# Patient Record
Sex: MALE | Race: WHITE | NOT HISPANIC OR LATINO | Employment: FULL TIME | URBAN - METROPOLITAN AREA
[De-identification: names, ages, dates, MRNs, and addresses within clinical notes are randomized per-mention and may not be internally consistent; named-entity substitution may affect disease eponyms.]

---

## 2017-04-03 ENCOUNTER — COMMUNICATION - HEALTHEAST (OUTPATIENT)
Dept: FAMILY MEDICINE | Facility: CLINIC | Age: 22
End: 2017-04-03

## 2017-04-10 ENCOUNTER — OFFICE VISIT - HEALTHEAST (OUTPATIENT)
Dept: FAMILY MEDICINE | Facility: CLINIC | Age: 22
End: 2017-04-10

## 2017-04-10 DIAGNOSIS — G43.909 MIGRAINE: ICD-10-CM

## 2017-04-10 DIAGNOSIS — F41.8 DEPRESSION WITH ANXIETY: ICD-10-CM

## 2017-04-10 ASSESSMENT — MIFFLIN-ST. JEOR: SCORE: 1538.77

## 2017-04-12 ENCOUNTER — AMBULATORY - HEALTHEAST (OUTPATIENT)
Dept: FAMILY MEDICINE | Facility: CLINIC | Age: 22
End: 2017-04-12

## 2017-05-01 ENCOUNTER — OFFICE VISIT - HEALTHEAST (OUTPATIENT)
Dept: FAMILY MEDICINE | Facility: CLINIC | Age: 22
End: 2017-05-01

## 2017-05-01 DIAGNOSIS — F41.1 GAD (GENERALIZED ANXIETY DISORDER): ICD-10-CM

## 2017-05-01 ASSESSMENT — MIFFLIN-ST. JEOR: SCORE: 1534.23

## 2017-05-03 ENCOUNTER — AMBULATORY - HEALTHEAST (OUTPATIENT)
Dept: FAMILY MEDICINE | Facility: CLINIC | Age: 22
End: 2017-05-03

## 2017-06-12 ENCOUNTER — COMMUNICATION - HEALTHEAST (OUTPATIENT)
Dept: FAMILY MEDICINE | Facility: CLINIC | Age: 22
End: 2017-06-12

## 2017-08-24 ENCOUNTER — COMMUNICATION - HEALTHEAST (OUTPATIENT)
Dept: FAMILY MEDICINE | Facility: CLINIC | Age: 22
End: 2017-08-24

## 2017-08-25 ENCOUNTER — AMBULATORY - HEALTHEAST (OUTPATIENT)
Dept: FAMILY MEDICINE | Facility: CLINIC | Age: 22
End: 2017-08-25

## 2017-08-25 DIAGNOSIS — F41.8 DEPRESSION WITH ANXIETY: ICD-10-CM

## 2017-12-11 ENCOUNTER — OFFICE VISIT - HEALTHEAST (OUTPATIENT)
Dept: FAMILY MEDICINE | Facility: CLINIC | Age: 22
End: 2017-12-11

## 2017-12-11 DIAGNOSIS — F41.1 GAD (GENERALIZED ANXIETY DISORDER): ICD-10-CM

## 2017-12-11 ASSESSMENT — MIFFLIN-ST. JEOR: SCORE: 1570.52

## 2017-12-13 ENCOUNTER — AMBULATORY - HEALTHEAST (OUTPATIENT)
Dept: FAMILY MEDICINE | Facility: CLINIC | Age: 22
End: 2017-12-13

## 2018-01-23 ENCOUNTER — COMMUNICATION - HEALTHEAST (OUTPATIENT)
Dept: FAMILY MEDICINE | Facility: CLINIC | Age: 23
End: 2018-01-23

## 2018-01-31 ENCOUNTER — OFFICE VISIT - HEALTHEAST (OUTPATIENT)
Dept: FAMILY MEDICINE | Facility: CLINIC | Age: 23
End: 2018-01-31

## 2018-01-31 DIAGNOSIS — F41.1 GAD (GENERALIZED ANXIETY DISORDER): ICD-10-CM

## 2018-01-31 ASSESSMENT — MIFFLIN-ST. JEOR: SCORE: 1559.18

## 2018-02-15 ENCOUNTER — AMBULATORY - HEALTHEAST (OUTPATIENT)
Dept: FAMILY MEDICINE | Facility: CLINIC | Age: 23
End: 2018-02-15

## 2018-10-27 ENCOUNTER — OFFICE VISIT - HEALTHEAST (OUTPATIENT)
Dept: FAMILY MEDICINE | Facility: CLINIC | Age: 23
End: 2018-10-27

## 2018-10-27 DIAGNOSIS — M54.50 ACUTE LUMBAR BACK PAIN: ICD-10-CM

## 2018-10-27 DIAGNOSIS — M54.6 MIDLINE THORACIC BACK PAIN: ICD-10-CM

## 2019-07-02 ENCOUNTER — RECORDS - HEALTHEAST (OUTPATIENT)
Dept: ADMINISTRATIVE | Facility: OTHER | Age: 24
End: 2019-07-02

## 2019-07-29 ENCOUNTER — RECORDS - HEALTHEAST (OUTPATIENT)
Dept: ADMINISTRATIVE | Facility: OTHER | Age: 24
End: 2019-07-29

## 2019-11-20 ENCOUNTER — OFFICE VISIT - HEALTHEAST (OUTPATIENT)
Dept: FAMILY MEDICINE | Facility: CLINIC | Age: 24
End: 2019-11-20

## 2019-11-20 DIAGNOSIS — F32.0 CURRENT MILD EPISODE OF MAJOR DEPRESSIVE DISORDER, UNSPECIFIED WHETHER RECURRENT (H): ICD-10-CM

## 2019-11-20 DIAGNOSIS — Z23 NEED FOR VACCINATION: ICD-10-CM

## 2019-11-20 ASSESSMENT — ANXIETY QUESTIONNAIRES
IF YOU CHECKED OFF ANY PROBLEMS ON THIS QUESTIONNAIRE, HOW DIFFICULT HAVE THESE PROBLEMS MADE IT FOR YOU TO DO YOUR WORK, TAKE CARE OF THINGS AT HOME, OR GET ALONG WITH OTHER PEOPLE: SOMEWHAT DIFFICULT
2. NOT BEING ABLE TO STOP OR CONTROL WORRYING: NOT AT ALL
GAD7 TOTAL SCORE: 7
3. WORRYING TOO MUCH ABOUT DIFFERENT THINGS: MORE THAN HALF THE DAYS
6. BECOMING EASILY ANNOYED OR IRRITABLE: MORE THAN HALF THE DAYS
1. FEELING NERVOUS, ANXIOUS, OR ON EDGE: SEVERAL DAYS
4. TROUBLE RELAXING: SEVERAL DAYS
5. BEING SO RESTLESS THAT IT IS HARD TO SIT STILL: SEVERAL DAYS
7. FEELING AFRAID AS IF SOMETHING AWFUL MIGHT HAPPEN: NOT AT ALL

## 2019-11-20 ASSESSMENT — PATIENT HEALTH QUESTIONNAIRE - PHQ9: SUM OF ALL RESPONSES TO PHQ QUESTIONS 1-9: 9

## 2019-11-20 ASSESSMENT — MIFFLIN-ST. JEOR: SCORE: 1563.71

## 2019-11-27 ENCOUNTER — AMBULATORY - HEALTHEAST (OUTPATIENT)
Dept: FAMILY MEDICINE | Facility: CLINIC | Age: 24
End: 2019-11-27

## 2019-12-30 ENCOUNTER — OFFICE VISIT - HEALTHEAST (OUTPATIENT)
Dept: FAMILY MEDICINE | Facility: CLINIC | Age: 24
End: 2019-12-30

## 2019-12-30 DIAGNOSIS — F39 MOOD DISORDER (H): ICD-10-CM

## 2019-12-30 DIAGNOSIS — F41.1 GAD (GENERALIZED ANXIETY DISORDER): ICD-10-CM

## 2019-12-30 ASSESSMENT — MIFFLIN-ST. JEOR: SCORE: 1559.18

## 2019-12-30 ASSESSMENT — ANXIETY QUESTIONNAIRES
4. TROUBLE RELAXING: NOT AT ALL
2. NOT BEING ABLE TO STOP OR CONTROL WORRYING: NOT AT ALL
3. WORRYING TOO MUCH ABOUT DIFFERENT THINGS: NOT AT ALL
6. BECOMING EASILY ANNOYED OR IRRITABLE: NOT AT ALL
7. FEELING AFRAID AS IF SOMETHING AWFUL MIGHT HAPPEN: NOT AT ALL
1. FEELING NERVOUS, ANXIOUS, OR ON EDGE: NOT AT ALL
5. BEING SO RESTLESS THAT IT IS HARD TO SIT STILL: NOT AT ALL
IF YOU CHECKED OFF ANY PROBLEMS ON THIS QUESTIONNAIRE, HOW DIFFICULT HAVE THESE PROBLEMS MADE IT FOR YOU TO DO YOUR WORK, TAKE CARE OF THINGS AT HOME, OR GET ALONG WITH OTHER PEOPLE: NOT DIFFICULT AT ALL
GAD7 TOTAL SCORE: 0

## 2019-12-30 ASSESSMENT — PATIENT HEALTH QUESTIONNAIRE - PHQ9: SUM OF ALL RESPONSES TO PHQ QUESTIONS 1-9: 0

## 2020-01-09 ENCOUNTER — AMBULATORY - HEALTHEAST (OUTPATIENT)
Dept: FAMILY MEDICINE | Facility: CLINIC | Age: 25
End: 2020-01-09

## 2020-01-18 ENCOUNTER — COMMUNICATION - HEALTHEAST (OUTPATIENT)
Dept: FAMILY MEDICINE | Facility: CLINIC | Age: 25
End: 2020-01-18

## 2020-01-18 DIAGNOSIS — F32.0 CURRENT MILD EPISODE OF MAJOR DEPRESSIVE DISORDER, UNSPECIFIED WHETHER RECURRENT (H): ICD-10-CM

## 2020-02-18 ENCOUNTER — COMMUNICATION - HEALTHEAST (OUTPATIENT)
Dept: FAMILY MEDICINE | Facility: CLINIC | Age: 25
End: 2020-02-18

## 2020-02-18 DIAGNOSIS — F32.0 CURRENT MILD EPISODE OF MAJOR DEPRESSIVE DISORDER, UNSPECIFIED WHETHER RECURRENT (H): ICD-10-CM

## 2020-12-21 ENCOUNTER — RECORDS - HEALTHEAST (OUTPATIENT)
Dept: ADMINISTRATIVE | Facility: OTHER | Age: 25
End: 2020-12-21

## 2021-04-05 ENCOUNTER — COMMUNICATION - HEALTHEAST (OUTPATIENT)
Dept: FAMILY MEDICINE | Facility: CLINIC | Age: 26
End: 2021-04-05

## 2021-05-26 ASSESSMENT — PATIENT HEALTH QUESTIONNAIRE - PHQ9: SUM OF ALL RESPONSES TO PHQ QUESTIONS 1-9: 9

## 2021-05-27 ASSESSMENT — PATIENT HEALTH QUESTIONNAIRE - PHQ9: SUM OF ALL RESPONSES TO PHQ QUESTIONS 1-9: 0

## 2021-05-28 ASSESSMENT — ANXIETY QUESTIONNAIRES
GAD7 TOTAL SCORE: 0
GAD7 TOTAL SCORE: 7

## 2021-05-30 VITALS — WEIGHT: 129 LBS | HEIGHT: 67 IN | BODY MASS INDEX: 20.25 KG/M2

## 2021-05-30 VITALS — HEIGHT: 67 IN | BODY MASS INDEX: 20.09 KG/M2 | WEIGHT: 128 LBS

## 2021-05-31 VITALS — HEIGHT: 67 IN | WEIGHT: 136 LBS | BODY MASS INDEX: 21.35 KG/M2

## 2021-06-01 VITALS — WEIGHT: 137 LBS | BODY MASS INDEX: 22.02 KG/M2 | HEIGHT: 66 IN

## 2021-06-02 VITALS — WEIGHT: 145 LBS | BODY MASS INDEX: 23.4 KG/M2

## 2021-06-03 NOTE — PROGRESS NOTES
" PROGRESS NOTE       SUBJECTIVE:  Jamir Vallejo is a 24 y.o. male   Chief Complaint   Patient presents with     Depression     discuss medications    Shahzad was here with his mother Carmela.  He has shared with her that he is not been doing well.  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  He continues to live at home.  He works [employer] part-time now.  He is been able to do his college work fine but he has been off his SSRI for quite some time now.  Sertraline caused vomiting and that was in April 2017.  He was switched to citalopram 20 mg and seemed to do better with that.  He has been suicidal in the past and was seen at the emergency room.  This was connected to a break-up with a girlfriend.  Mother reports that he had been in a new relationship recently and she just broke up with him prompting her to say she was bring him in to see me.  Shahzad denies being suicidal and thinks he is smarter about things this time but it still does not feel good.  He did try going to [id number] but it \"did not work out\".  He finds it difficult to share.  We talked about the challenges of finding a counselor that one can work with.  He is willing to take medicine again knowing that it does help.      Patient Active Problem List   Diagnosis     FLASH (generalized anxiety disorder)     Depression, major, recurrent, severe with psychosis (H)     Mood disorder (H)       Current Outpatient Medications   Medication Sig Dispense Refill     citalopram (CELEXA) 40 MG tablet Take 1 tablet (40 mg total) by mouth daily. 30 tablet 2     hydrOXYzine pamoate (VISTARIL) 25 MG capsule Take 1 capsule (25 mg total) by mouth 3 (three) times a day as needed for itching. 30 capsule 0     sertraline (ZOLOFT) 50 MG tablet Take 1 tablet (50 mg total) by mouth daily. 30 tablet 2     SUMAtriptan (IMITREX) 50 MG tablet Take p.o. at very start of headache, may repeat in 2 hours       No current facility-administered medications for this " visit.        Social History     Tobacco Use   Smoking Status Never Smoker   Smokeless Tobacco Never Used       REVIEW OF SYSTEMS:     Patient has little interest and pleasure in doing things more than half the time, he has some sleep disruption more than half the time.  He feels down and depressed several days, tired, poor appetite, feeling guilty about his feelings, and difficulty concentrating several days per week.  He specifically denies suicidal thoughts or harmful thoughts for himself or others.  He also has not had decreased moving or speaking or the opposite restlessness.  PHQ 9 score is 9 and beverley 7 score is 7.  OBJECTIVE:         Vitals:    11/20/19 1634   BP: 114/68   Pulse: 70     Weight: 138 lb (62.6 kg)    Wt Readings from Last 3 Encounters:   11/20/19 138 lb (62.6 kg)   10/27/18 145 lb (65.8 kg)   01/31/18 137 lb (62.1 kg)     Body mass index is 22.27 kg/m .        Physical Exam:  GENERAL APPEARANCE: A&A, NAD, well hydrated, well nourished.  Shahzad is neatly dressed.  His mood is serious.  He answers questions easily and he makes eye contact.  I did not see him smile.  Heart sounds are normal and lung sounds are clear.  PSYCHIATRIC:  Mood appropriate, memory intact        ASSESSMENT/PLAN:     1. Current mild episode of major depressive disorder, unspecified whether recurrent (H)  He will begin by taking 1/2 tablet for the first 2 to 4 days until his body adjusts.  That way he may avoid nausea.  - sertraline (ZOLOFT) 50 MG tablet; Take 1 tablet (50 mg total) by mouth daily.  Dispense: 30 tablet; Refill: 2    2. Need for vaccination  Flu vaccine is administered.  - Influenza,Seasonal,Quad,INJ =/>6months      I recommend patient come back and see me in 6-8 weeks, especially since he is not planning to see a counselor.  Sooner if any problems.  I spent a total of 30 minutes face to face with the patient.  Over 50% of the time spent counseling and educating the patient about all of the above.      Laly Malik  MD Jorge

## 2021-06-04 VITALS
HEIGHT: 66 IN | HEART RATE: 70 BPM | DIASTOLIC BLOOD PRESSURE: 68 MMHG | SYSTOLIC BLOOD PRESSURE: 114 MMHG | WEIGHT: 138 LBS | BODY MASS INDEX: 22.18 KG/M2

## 2021-06-04 VITALS
DIASTOLIC BLOOD PRESSURE: 62 MMHG | HEIGHT: 66 IN | HEART RATE: 78 BPM | WEIGHT: 137 LBS | SYSTOLIC BLOOD PRESSURE: 100 MMHG | BODY MASS INDEX: 22.02 KG/M2

## 2021-06-04 NOTE — PROGRESS NOTES
PROGRESS NOTE       SUBJECTIVE:  Jamir Vallejo is a 24 y.o. male   Chief Complaint   Patient presents with     Follow-up     Shahzad is here in follow-up from his overreaction when his girlfriend broke up with him.  We talked about how to anticipate things a little better and keep the emotions in check and being smart.  He feels he is in a better place now and is working with a counselor.  He is finding it helpful.  He continues to live at home with his parents.  He is eating well and sleeping fine.  He is attending school in [employer].  He has another year left.  He is looking forward to being done.    He is taking the sertraline 50 mg daily and has no problems with it.  He prefers to stay on it and sees no reason to change dosing.  His counselor agrees.    Patient Active Problem List   Diagnosis     JERZY (generalized anxiety disorder)     Depression, major, recurrent, severe with psychosis (H)     Mood disorder (H)       Current Outpatient Medications   Medication Sig Dispense Refill     sertraline (ZOLOFT) 50 MG tablet Take 1 tablet (50 mg total) by mouth daily. 30 tablet 2     No current facility-administered medications for this visit.        Social History     Tobacco Use   Smoking Status Never Smoker   Smokeless Tobacco Never Used       REVIEW OF SYSTEMS:   Jerzy 7 score is 0 and PHQ 9 score is 0.      OBJECTIVE:       Vitals:    12/30/19 1101   BP: 100/62   Pulse: 78     Weight: 137 lb (62.1 kg)    Wt Readings from Last 3 Encounters:   12/30/19 137 lb (62.1 kg)   11/20/19 138 lb (62.6 kg)   10/27/18 145 lb (65.8 kg)     Body mass index is 22.11 kg/m .        Physical Exam:  GENERAL APPEARANCE: A&A, NAD, well hydrated, well nourished  Shahzad appears well with a social smile, good eye contact and is tended to self cares.  He answers questions appropriately and spontaneously.        ASSESSMENT/PLAN:     1. JERZY (generalized anxiety disorder)      2. Mood disorder (H)  We talked about ways to anticipate stresses  and allow himself time before he reacts to things.        I spent a total of 25 minutes face to face with the patient.  Over 50% of the time spent counseling and educating the patient about all of the above.      Laly Patel MD

## 2021-06-05 NOTE — TELEPHONE ENCOUNTER
Refill Approved    Rx renewed per Medication Renewal Policy. Medication was last renewed on 11/20/19.    [facility] Triage/Med Refill 1/19/2020     Requested Prescriptions   Pending Prescriptions Disp Refills     sertraline (ZOLOFT) 50 MG tablet [Pharmacy Med Name: SERTRALINE HCL 50MG TABS] 30 tablet 2     Sig: TAKE ONE TABLET BY MOUTH ONCE DAILY       SSRI Refill Protocol  Passed - 1/18/2020  4:19 PM        Passed - PCP or prescribing provider visit in last year     Last office visit with prescriber/PCP: 12/30/2019 Laly Patel MD OR same dept: 12/30/2019 Laly Patel MD OR same specialty: 12/30/2019 Laly Patel MD  Last physical: Visit date not found Last MTM visit: Visit date not found   Next visit within 3 mo: Visit date not found  Next physical within 3 mo: Visit date not found  Prescriber OR PCP: Laly Patel MD  Last diagnosis associated with med order: 1. Current mild episode of major depressive disorder, unspecified whether recurrent (H)  - sertraline (ZOLOFT) 50 MG tablet [Pharmacy Med Name: SERTRALINE HCL 50MG TABS]; TAKE ONE TABLET BY MOUTH ONCE DAILY  Dispense: 30 tablet; Refill: 2    If protocol passes may refill for 12 months if within 3 months of last provider visit (or a total of 15 months).
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
## 2021-06-10 NOTE — PROGRESS NOTES
ASSESSMENT/PLAN:     1. FLASH (generalized anxiety disorder)  Patient has made remarkable progress in only a couple weeks.  We talked about how this is almost unusual that he is turning things around so quickly.  There will be bumps in the road.  The medicine certainly is helping him stabilize his mood so he has a solid base to work from.  He may encounter more difficulty and he should consider that just a bad day and not assigned that it is all coming back.  He is looking into getting therapy set up and has some places to call.  Things are going better at home as well because everyone is recognizing he is feeling better.  It also helps to be working full-time.  I would like for him to come back and see me in 2 months to be sure he continues on the right track.  Patient agrees with the plan.    There are no Patient Instructions on file for this visit.  There are no discontinued medications.  Return in about 2 months (around 7/1/2017) for Anxiety.    Laly Patel MD    CHIEF COMPLAINT  Chief Complaint   Patient presents with     Follow-up     new medications, going good        HPI:  Jamir Vallejo is a 21 y.o. male presenting to the clinic today to follow-up regarding depression.    Depression: He is currently controlling his mood with 50 mg of daily sertraline. He started taking sertraline three weeks ago. He had problems since December of 2016. He has found sertraline to be helpful and has not noticed any problems with it. He has been able to get to bed on time and sleep more. He has noticed an increase in appetite, as well as a change in attitude. He states that he is less cranky and others, his parents and friends, have noticed this attitude change through conversations. He has noticed that he feels slightly more relaxed in social situations. He is continuing to consider seeing a therapist.    REVIEW OF SYSTEMS:   He denies diarrhea. All other systems negative.     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 He continues to live at home.    Patient Active Problem List   Diagnosis     FLASH (generalized anxiety disorder)       TOBACCO USE:  History   Smoking Status     Never Smoker   Smokeless Tobacco     Not on file     Social History     Social History     Marital status: Single     Spouse name: N/A     Number of children: N/A     Years of education: N/A     Occupational History     Detailing cars      [employer]     Social History Main Topics     Smoking status: Never Smoker     Smokeless tobacco: Not on file     Alcohol use Not on file     Drug use: Not on file     Sexual activity: Not on file     Other Topics Concern     Not on file     Social History Narrative       OBJECTIVE:   PHQ-9 score 2, FLASH-7 score 1    Vitals:    05/01/17 1258   BP: 100/62   Pulse: 78     Weight: 128 lb (58.1 kg)  Wt Readings from Last 3 Encounters:   05/01/17 128 lb (58.1 kg) (10 %, Z= -1.27)*   04/10/17 129 lb (58.5 kg) (11 %, Z= -1.21)*   09/09/14 127 lb (57.6 kg) (25 %, Z= -0.68)*     * Growth percentiles are based on CDC 2-20 Years data.     Body mass index is 20.05 kg/(m^2).      Physical Exam:  GENERAL APPEARANCE: A&A, NAD, well hydrated, well nourished  NEURO: no gross deficits   PSYCHIATRIC;  Mood appropriate, memory intact      The following are part of a depression follow up plan for the patient:  implementation of measures to provide psychological support, mental health care assessment and patient follow-up to return when and if necessary      Laly Patel MD    ADDITIONAL HISTORY SUMMARIZED (2): None.  DECISION TO OBTAIN EXTRA INFORMATION (1): None.   RADIOLOGY TESTS (1): None.  LABS (1): None.  MEDICINE TESTS (1): None.  INDEPENDENT REVIEW (2 each): None.     The visit lasted a total of 12 minutes face to face with the patient. Over 50% of the time was spent counseling and educating the patient about depression.    Lex NEAL, am scribing for and in the presence  of, Dr. Patel.    I, Dr. Patel, personally performed the services described in this documentation, as scribed by Lex Mohr in my presence, and it is both accurate and complete.       MEDICATIONS   Current Outpatient Prescriptions   Medication Sig Dispense Refill     sertraline (ZOLOFT) 50 MG tablet Take 1 tablet (50 mg total) by mouth daily. 30 tablet 2     SUMAtriptan (IMITREX) 50 MG tablet Take p.o. at very start of headache, may repeat in 2 hours       No current facility-administered medications for this visit.          Total data points: 0

## 2021-06-14 NOTE — PROGRESS NOTES
" PROGRESS NOTE       SUBJECTIVE:  Jamir Vallejo is a 22 y.o. male   Chief Complaint   Patient presents with     Follow-up     DISCUSS MEDICATION, POSSIBLE CHANGE    Shahzad is here today to review his medications.  He states that he does not think it is working anymore.  He takes the medicine and nothing happens.  He generally has not been taking it for a while.  When he took it it \"made him sick and he vomited.  He tried to get back on it but it kept making him sick.    Patient is currently working at [email or web address].  He has a girlfriend and they are planning to move to an apartment in [place] as soon as they are able financially which may be springtime.  He has no plans for advanced education at this time.  He states everything one step at a time.      Patient Active Problem List   Diagnosis     FLASH (generalized anxiety disorder)       Current Outpatient Prescriptions   Medication Sig Dispense Refill     sertraline (ZOLOFT) 50 MG tablet Take 1 tablet (50 mg total) by mouth daily. 90 tablet 1     SUMAtriptan (IMITREX) 50 MG tablet Take p.o. at very start of headache, may repeat in 2 hours       No current facility-administered medications for this visit.        History   Smoking Status     Never Smoker   Smokeless Tobacco     Not on file       REVIEW OF SYSTEMS:  Patient denies fever, chills, dizziness, headache, visual change, cough, chest pain, shortness of breath, abdominal pain, extremity pain or swelling.  Denies mood swings, excessive sadness, crying episodes, fatigue, insomnia, anhedonia, irritability, feelings of extreme energy, suicidal/homicidal ideations.    Denies feelings of guilt, worthlessness, ruminations, appetite change, hopelessness, helplessness, low mood >2 weeks, decreased interest, irritability, crying, low energy, decreased concentration, psychomotor slowing or agitation.    He does have troubles falling asleep and feels depressed several days out of the week.  This is not persistent, " however.      OBJECTIVE:       Vitals:    12/11/17 1559   BP: 114/70   Pulse: 72     Weight: 136 lb (61.7 kg)  Wt Readings from Last 3 Encounters:   12/11/17 136 lb (61.7 kg)   05/01/17 128 lb (58.1 kg) (10 %, Z= -1.27)*   04/10/17 129 lb (58.5 kg) (11 %, Z= -1.21)*     * Growth percentiles are based on [facility] 2-20 Years data.     Body mass index is 21.3 kg/(m^2).        Physical Exam:  GENERAL APPEARANCE: A&A, NAD, well hydrated, well nourished  Patient has good eye contact and is socially appropriate with a spontaneous smile.  PSYCHIATRIC:  Mood appropriate, memory intact        ASSESSMENT/PLAN:     1. BEVERLEY (generalized anxiety disorder)  Shahzad was working hard at figuring things out.  We talked about how this is a difficult time for a lot of people his age.  It is very difficult to make it on your own without parental support.  I pointed out that his beverley 7 score is 2 and his PHQ 9 score is 3.  When we began treatment his beverley 7 score was 20 and his PHQ 9 score was 23.  This tells me that he most certainly is doing better and while the numbers do not tell the whole story, it is reassuring.  It is good for him to recognize his feelings and then figure out what to do with them.  This is a very grown up process that he will need to practice.    I think the reason the Zoloft made him sick when he restarted his that he did not ease into it by cutting it in half.  On the other hand, because he has had side effects from it and he has not been taking it I recommended we try citalopram.  He will cut these in half the first 4 days and then go up to a full dose.  He should return to see me in 3 months.  - citalopram (CELEXA) 20 MG tablet; Take 1 tablet (20 mg total) by mouth daily.  Dispense: 30 tablet; Refill: 5    There are no Patient Instructions on file for this visit.  There are no discontinued medications.  No Follow-up on file.    The visit lasted a total of 25 minutes face to face with the patient.  Over 50% of the time  spent counseling and educating the patient about all of the above.      Laly Patel MD

## 2021-06-15 PROBLEM — F41.1 GAD (GENERALIZED ANXIETY DISORDER): Status: ACTIVE | Noted: 2017-05-01

## 2021-06-15 NOTE — PROGRESS NOTES
PROGRESS NOTE       SUBJECTIVE:  Jamir Vallejo is a 22 y.o. male   Chief Complaint   Patient presents with     Follow-up     depression issues    Shahzad was hospitalized for suicidal ideation.  He states that he was glad that he got help and he is feeling better about things.  He is working with a therapist on reducing anxiety and what to do when he feels panic.  He is working at [employer] up to 30 hours per week.  This is going okay.  He is wondering if a dose increase would be beneficial.  He and his therapist have discussed this.      Patient Active Problem List   Diagnosis     BEVERLEY (generalized anxiety disorder)     Depression, major, recurrent, severe with psychosis     Mood disorder       Current Outpatient Prescriptions   Medication Sig Dispense Refill     citalopram (CELEXA) 40 MG tablet Take 1 tablet (40 mg total) by mouth daily. 30 tablet 2     hydrOXYzine pamoate (VISTARIL) 25 MG capsule Take 1 capsule (25 mg total) by mouth 3 (three) times a day as needed for itching. 30 capsule 0     SUMAtriptan (IMITREX) 50 MG tablet Take p.o. at very start of headache, may repeat in 2 hours       No current facility-administered medications for this visit.        History   Smoking Status     Never Smoker   Smokeless Tobacco     Never Used       REVIEW OF SYSTEMS:   Patient feels anxious more than half the time currently.  He is now able to stop the worrying and has not been worrying about different things.  He is now able to relax with the techniques he has been taught.  Sometimes it is hard to sit still and he does remain irritable over half the time.  He still has concerns that something bad might happen.  His bveerley 7 score is 8 and his PHQ 9 score is 9.        OBJECTIVE:       Vitals:    01/31/18 1435   BP: 100/62   Pulse: 92   SpO2: 98%     Weight: 137 lb (62.1 kg)  Wt Readings from Last 3 Encounters:   01/31/18 137 lb (62.1 kg)   12/11/17 136 lb (61.7 kg)   05/01/17 128 lb (58.1 kg) (10 %, Z= -1.27)*     * Growth  percentiles are based on [facility] 2-20 [facility] data.     Body mass index is 22.11 kg/(m^2).        Physical Exam:  GENERAL APPEARANCE: A&A, NAD, well hydrated, well nourished  Otherwise not examined  PSYCHIATRIC:  Mood appropriate, memory intact patient has good eye contact and is socially appropriate.  He is tended to self cares. His gaze does shift away at times.        ASSESSMENT/PLAN:     1. FLASH (generalized anxiety disorder)  I agree with increasing his Celexa to 40 mg daily.  I have also recommended using hydroxyzine 25 mg as needed for anxiety.  - hydrOXYzine pamoate (VISTARIL) 25 MG capsule; Take 1 capsule (25 mg total) by mouth 3 (three) times a day as needed for anxiety.  He agrees to give this a try if needed.  Dispense: 30 capsule; Refill: 0  - citalopram (CELEXA) 40 MG tablet; Take 1 tablet (40 mg total) by mouth daily.  Dispense: 30 tablet; Refill: 2      There are no Patient Instructions on file for this visit.  Medications Discontinued During This Encounter   Medication Reason     citalopram (CELEXA) 20 MG tablet Dose adjustment     Return in about 2 months (around 3/31/2018) for Recheck, Anxiety.  He will continue working with his therapist.  I had like to see him back to see how the medications working.  If he has any adverse reactions he should me know.    The visit lasted a total of 30 minutes face to face with the patient.  Over 50% of the time spent counseling and educating the patient about all of the above.      Laly Patel MD       no previous reaction

## 2021-06-16 PROBLEM — F39 MOOD DISORDER (H): Status: ACTIVE | Noted: 2018-01-24

## 2021-06-16 PROBLEM — F33.3 DEPRESSION, MAJOR, RECURRENT, SEVERE WITH PSYCHOSIS (H): Status: ACTIVE | Noted: 2018-01-24

## 2021-06-21 NOTE — PROGRESS NOTES
Chief Complaint   Patient presents with     Back Pain     Back injury at work 10/27/2018 at 10:15 am          HPI      Patient is here for work related back injury occurred around 10:15 am today. He said he slipped on a slippery concrete floor and fell back wards hurting upper and lower back. Lower back is moderate, upper back is mild to moderate. Pain is constant, worsened with walking or moving and improves with rest. No radiation of pain down legs. No fever, headache, head injury, nausea, vomiting, chest pain, shortness of breath. No urinary nor bowel incontinence.     ROS: Pertinent ROS noted in HPI.     Allergies   Allergen Reactions     Amoxicillin        Patient Active Problem List   Diagnosis     FLASH (generalized anxiety disorder)     Depression, major, recurrent, severe with psychosis (H)     Mood disorder (H)       Family History   Problem Relation Age of Onset     No Medical Problems Mother      No Medical Problems Father      Anxiety disorder Sister        Social History     Social History     Marital status: Single     Spouse name: N/A     Number of children: N/A     Years of education: N/A     Occupational History     Detailing cars      [employer]     Social History Main Topics     Smoking status: Never Smoker     Smokeless tobacco: Never Used     Alcohol use No     Drug use: No     Sexual activity: Yes     Other Topics Concern     Not on file     Social History Narrative    Lives with parents          Objective:    Vitals:    10/27/18 1135   BP: 110/66   Pulse: 88   Resp: 18   Temp: 98.1  F (36.7  C)   SpO2: 98%       Gen:NAD  Head: atraumatic, no pain to palpation  Neck: normal inspection, normal palpation, full ROM of neck in all planes.   CV: RRR, normal S1S2, no M, R, G  Pulm: CTAB, normal effort  MSK: normal inspection of back and upper extremities. Mild pain to palpation of proximal T spine, and moderate pain to palpation of lumbar spine. Mild pain to palpation at left upper para-thoracic  area. Negative straight leg raising tests. Full ROM of back. Normal gait.  Neuro: Cranial nerves: Normal facial movements, normal speech.  Tongue is midline.  Normal eye movement.  Shoulder shrug is strong and symmetrical.  Skin: dry, warm, no acute lesions    XRs - ordered and reviewed by me, no acute bony abnormalities, discussed during visit.    Xr Thoracic Spine 3 Vws    Result Date: 10/27/2018  XR THORACIC SPINE 3 VWS 10/27/2018 11:59 AM INDICATION: Proximal thoracic spine pain from fall today COMPARISON: None. FINDINGS: 12 rib bearing thoracic type vertebra. Slight midthoracic dextrocurvature. No fracture or subluxation. Preserved intervertebral disc heights. Included lung fields are clear.     Xr Lumbar Spine 2 Or 3 Vws    Result Date: 10/27/2018  XR LUMBAR SPINE 2 OR 3 VWS 10/27/2018 11:59 AM INDICATION: Lumbar spine pain from fall today COMPARISON: None. FINDINGS: 5 lumbar type vertebra. Normal lumbar alignment. No fracture or subluxation. Preserved intervertebral disc heights.      Acute lumbar back pain  -     XR Lumbar Spine 2 or 3 VWS    Midline thoracic back pain  -     XR Thoracic Spine 3 VWS      Likely muscular symptoms. Supportive cares and f/u as directed.

## 2021-07-31 ENCOUNTER — HEALTH MAINTENANCE LETTER (OUTPATIENT)
Age: 26
End: 2021-07-31

## 2021-09-25 ENCOUNTER — HEALTH MAINTENANCE LETTER (OUTPATIENT)
Age: 26
End: 2021-09-25

## 2021-10-12 ENCOUNTER — E-VISIT (OUTPATIENT)
Dept: FAMILY MEDICINE | Facility: CLINIC | Age: 26
End: 2021-10-12
Payer: COMMERCIAL

## 2021-10-12 DIAGNOSIS — Z20.822 SUSPECTED COVID-19 VIRUS INFECTION: Primary | ICD-10-CM

## 2021-10-12 PROCEDURE — 99421 OL DIG E/M SVC 5-10 MIN: CPT | Performed by: PHYSICIAN ASSISTANT

## 2021-10-12 NOTE — PATIENT INSTRUCTIONS
Dear Jamir Vallejo,    Your symptoms show that you may have coronavirus (COVID-19). This illness can cause fever, cough and trouble breathing. Many people get a mild case and get better on their own. Some people can get very sick.    Will I be tested for COVID-19?  We would like to test you for Covid-19 virus. I have placed orders for this test.     To schedule: go to your [employer] home page and scroll down to the section that says  You have an appointment that needs to be scheduled  and click the large green button that says  Schedule Now  and follow the steps to find the next available openings.    If you are unable to complete these [employer] scheduling steps, please call [phone] to schedule your testing.     Return to work/school/ guidance:  Please let your workplace manager and staffing office know when your quarantine ends     We can t give you an exact date as it depends on the above. You can calculate this on your own or work with your manager/staffing office to calculate this. (For example if you were exposed on 10/4, you would have to quarantine for 14 full days. That would be through 10/18. You could return on 10/19.)      If you receive a positive COVID-19 test result, follow the guidance of the those who are giving you the results. Usually the return to work is 10 (or in some cases 20 days from symptom onset.) If you work at [facility], you must also be cleared by Employee Occupational Health and Safety to return to work.        If you receive a negative COVID-19 test result and did not have a high risk exposure to someone with a known positive COVID-19 test, you can return to work once you're free of fever for 24 hours without fever-reducing medication and your symptoms are improving or resolved.      If you receive a negative COVID-19 test and If you had a high risk exposure to someone who has tested positive for COVID-19 then you can return to work 14 days after your last contact  with the positive individual    Note: If you have ongoing exposure to the covid positive person, this quarantine period may be more than 14 days. (For example, if you are continued to be exposed to your child who tested positive and cannot isolate from them, then the quarantine of 7-14 days can't start until your child is no longer contagious. This is typically 10 days from onset of the child's symptoms. So the total duration may be 17-24 days in this case.)    Sign up for [employer].   We know it's scary to hear that you might have COVID-19. We want to track your symptoms to make sure you're okay over the next 2 weeks. Please look for an email from [employer]--this is a free, online program that we'll use to keep in touch. To sign up, follow the link in the email you will receive. Learn more at [email or web address]    How can I take care of myself?    Get lots of rest. Drink extra fluids (unless a doctor has told you not to)    Take Tylenol (acetaminophen) or ibuprofen for fever or pain. If you have liver or kidney problems, ask your family doctor if it's okay to take Tylenol o ibuprofen    If you have other health problems (like cancer, heart failure, an organ transplant or severe kidney disease): Call your specialty clinic if you don't feel better in the next 2 days.    Know when to call 911. Emergency warning signs include:  o Trouble breathing or shortness of breath  o Pain or pressure in the chest that doesn't go away  o Feeling confused like you haven't felt before, or not being able to wake up  o Bluish-colored lips or face    Where can I get more information?  [facility] - About COVID-19:   [email or web address]    CDC - What to Do If You're Sick:   www.cdc.gov/coronavirus/2019-ncov/about/steps-when-sick.html

## 2021-10-12 NOTE — TELEPHONE ENCOUNTER
Provider E-Visit time total (minutes): 3   Vaccine Information Statement(s) was given today. This has been reviewed, questions answered, and verbal consent given by Parent for injection(s) and administration of Diphtheria/Tetanus/Pertussis (Dtap), Meningococcal , Polio (IPV) and Varicella-Chickenpox.        Patient tolerated without incident. See immunization grid for documentation.

## 2021-10-13 ENCOUNTER — LAB (OUTPATIENT)
Dept: URGENT CARE | Facility: URGENT CARE | Age: 26
End: 2021-10-13
Attending: PHYSICIAN ASSISTANT
Payer: COMMERCIAL

## 2021-10-13 DIAGNOSIS — Z20.822 SUSPECTED COVID-19 VIRUS INFECTION: ICD-10-CM

## 2021-10-13 LAB — SARS-COV-2 RNA RESP QL NAA+PROBE: POSITIVE

## 2021-10-13 PROCEDURE — U0003 INFECTIOUS AGENT DETECTION BY NUCLEIC ACID (DNA OR RNA); SEVERE ACUTE RESPIRATORY SYNDROME CORONAVIRUS 2 (SARS-COV-2) (CORONAVIRUS DISEASE [COVID-19]), AMPLIFIED PROBE TECHNIQUE, MAKING USE OF HIGH THROUGHPUT TECHNOLOGIES AS DESCRIBED BY CMS-2020-01-R: HCPCS

## 2021-10-13 PROCEDURE — U0005 INFEC AGEN DETEC AMPLI PROBE: HCPCS

## 2021-10-14 ENCOUNTER — TELEPHONE (OUTPATIENT)
Dept: URGENT CARE | Facility: URGENT CARE | Age: 26
End: 2021-10-14

## 2021-10-14 NOTE — TELEPHONE ENCOUNTER
"Coronavirus (COVID-19) Notification    Caller Name (Patient, parent, daughter/son, grandparent, etc)  patient    Reason for call  Notify of Positive Coronavirus (COVID-19) lab results, assess symptoms,  review [facility] recommendations    Lab Result    Lab test:  2019-nCoV rRt-PCR or SARS-CoV-2 PCR    Oropharyngeal AND/OR nasopharyngeal swabs is POSITIVE for 2019-nCoV RNA/SARS-COV-2 PCR (COVID-19 virus)    RN Recommendations/Instructions per [facility] Coronavirus COVID-19 recommendations    Brief introduction script  Introduce self then review script:  \"I am calling on behalf of [facility].  We were notified that your Coronavirus test (COVID-19) for was POSITIVE for the virus.  I have some information to relay to you but first I wanted to mention that the [place] Dept of Health will be contacting you shortly [it's possible MD already called Patient] to talk to you more about how you are feeling and other people you have had contact with who might now also have the virus.  Also, [facility] is Partnering with the [facility] for Covid-19 research, you may be contacted directly by research staff.\"    Patient reports he is fully vaccinated for Covid with Moderna.    Assessment (Inquire about Patient's current symptoms)   Assessment   Current Symptoms at time of phone call: (if no symptoms, document No symptoms] Body aches, headache, sore throat, nasal congestion, cough   Symptoms onset (if applicable) 10/10/2021     If at time of call, Patients symptoms hare worsened, the Patient should contact 911 or have someone drive them to Emergency Dept promptly:      If Patient calling 911, inform 911 personal that you have tested positive for the Coronavirus (COVID-19).  Place mask on and await 911 to arrive.    If Emergency Dept, If possible, please have another adult drive you to the Emergency Dept but you need to wear mask when in contact with other people.      Monoclonal Antibody " "Administration    You may be eligible to receive a new treatment with a monoclonal antibody for preventing hospitalization in patients at high risk for complications from COVID-19.   This medication is still experimental and available on a limited basis; it is given through an IV and must be given at an infusion center. Please note that not all people who are eligible will receive the medication since it is in limited supply.     Are you interested in being considered for this medication?  No.   Does the patient fit the criteria: Patient declined    If patient qualifies based on above criteria:  \"You will be contacted if you are selected to receive this treatment in the next 1-2 business days.   This is time sensitive and if you are not selected in the next 1-2 business days, you will not receive the medication.  If you do not receive a call to schedule, you have not been selected.\"      Review information with Patient    Your result was positive. This means you have COVID-19 (coronavirus).  We have sent you a letter that reviews the information that I'll be reviewing with you now.    How can I protect others?    If you have symptoms: stay home and away from others (self-isolate) until:    You've had no fever--and no medicine that reduces fever--for 1 full day (24 hours). And       Your other symptoms have gotten better. For example, your cough or breathing has improved. And     At least 10 days have passed since your symptoms started. (If you've been told by a doctor that you have a weak immune system, wait 20 days.)     If you don't have symptoms: Stay home and away from others (self-isolate) until at least 10 days have passed since your first positive COVID-19 test. (Date test collected)    During this time:    Stay in your own room, including for meals. Use your own bathroom if you can.    Stay away from others in your home. No hugging, kissing or shaking hands. No visitors.     Don't go to work, school or " anywhere else.     Clean  high touch  surfaces often (doorknobs, counters, handles, etc.). Use a household cleaning spray or wipes. You'll find a full list on the EPA website at www.epa.gov/pesticide-registration/list-n-disinfectants-use-against-sars-cov-2.     Cover your mouth and nose with a mask, tissue or other face covering to avoid spreading germs.    Wash your hands and face often with soap and water.    Make a list of people you have been in close contact with recently, even if either of you wore a face covering.   ; Start your list from 2 days before you became ill or had a positive test.  ; Include anyone that was within 6 feet of you for a cumulative total of 15 minutes or more in 24 hours. (Example: if you sat next to Madan for 5 minutes in the morning and 10 minutes in the afternoon, then you were in close contact for 15 minutes total that day. Madan would be added to your list.)    A public health worker will call or text you. It is important that you answer. They will ask you questions about possible exposures to COVID-19, such as people you have been in direct contact with and places you have visited.    Tell the people on your list that you have COVID-19; they should stay away from others for 14 days starting from the last time they were in contact with you (unless you are told something different from a public health worker).     Caregivers in these groups are at risk for severe illness due to COVID-19:  o People 65 years and older  o People who live in a nursing home or long-term care facility  o People with chronic disease (lung, heart, cancer, diabetes, kidney, liver, immunologic)  o People who have a weakened immune system, including those who:  - Are in cancer treatment  - Take medicine that weakens the immune system, such as corticosteroids  - Had a bone marrow or organ transplant  - Have an immune deficiency  - Have poorly controlled HIV or AIDS  - Are obese (body mass index of 40 or  higher)  - Smoke regularly    Caregivers should wear gloves while washing dishes, handling laundry and cleaning bedrooms and bathrooms.    Wash and dry laundry with special caution. Don't shake dirty laundry, and use the warmest water setting you can.    If you have a weakened immune system, ask your doctor about other actions you should take.    For more tips, go to www.cdc.gov/coronavirus/2019-ncov/downloads/10Things.pdf.    You should not go back to work until you meet the guidelines above for ending your home isolation. You don't need to be retested for COVID-19 before going back to work--studies show that you won't spread the virus if it's been at least 10 days since your symptoms started (or 20 days, if you have a weak immune system).    Employers: This document serves as formal notice of your employee's medical guidelines for going back to work. They must meet the above guidelines before going back to work in person.    How can I take care of myself?    1. Get lots of rest. Drink extra fluids (unless a doctor has told you not to).    2. Take Tylenol (acetaminophen) for fever or pain. If you have liver or kidney problems, ask your family doctor if it's okay to take Tylenol.     Take either:     650 mg (two 325 mg pills) every 4 to 6 hours, or     1,000 mg (two 500 mg pills) every 8 hours as needed.     Note: Don't take more than 3,000 mg in one day. Acetaminophen is found in many medicines (both prescribed and over-the-counter medicines). Read all labels to be sure you don't take too much.    For children, check the Tylenol bottle for the right dose (based on their age or weight).    3. If you have other health problems (like cancer, heart failure, an organ transplant or severe kidney disease): Call your specialty clinic if you don't feel better in the next 2 days.    4. Know when to call 911: Emergency warning signs include:    Trouble breathing or shortness of breath    Pain or pressure in the chest that  doesn't go away    Feeling confused like you haven't felt before, or not being able to wake up    Bluish-colored lips or face    5. Sign up for [employer]. We know it's scary to hear that you have COVID-19. We want to track your symptoms to make sure you're okay over the next 2 weeks. Please look for an email from [employer]--this is a free, online program that we'll use to keep in touch. To sign up, follow the link in the email. Learn more at [email or web address].    Where can I get more information?    [facility]: [email or web address]    Coronavirus Basics: www.health.[facility].[place]/diseases/coronavirus/basics.html    What to Do If You're Sick: www.cdc.gov/coronavirus/2019-ncov/about/steps-when-sick.html    Ending Home Isolation: www.cdc.gov/coronavirus/2019-ncov/hcp/disposition-in-home-patients.html     Caring for Someone with COVID-19: www.cdc.gov/coronavirus/2019-ncov/if-you-are-sick/care-for-someone.html     [facility] clinical trials (COVID-19 research studies): clinicalaffairs.[facility]/n-clinical-trials     A Positive COVID-19 letter will be sent via [employer] or the mail. (Exception, no letters sent to Presurgerical/Preprocedure Patients)    Vianca Chicnhilla LPN

## 2022-04-15 ENCOUNTER — OFFICE VISIT (OUTPATIENT)
Dept: FAMILY MEDICINE | Facility: CLINIC | Age: 27
End: 2022-04-15
Payer: COMMERCIAL

## 2022-04-15 VITALS
DIASTOLIC BLOOD PRESSURE: 70 MMHG | HEART RATE: 76 BPM | WEIGHT: 158 LBS | SYSTOLIC BLOOD PRESSURE: 116 MMHG | OXYGEN SATURATION: 98 % | BODY MASS INDEX: 23.95 KG/M2 | HEIGHT: 68 IN

## 2022-04-15 DIAGNOSIS — R07.89 ATYPICAL CHEST PAIN: ICD-10-CM

## 2022-04-15 DIAGNOSIS — F33.3 DEPRESSION, MAJOR, RECURRENT, SEVERE WITH PSYCHOSIS (H): ICD-10-CM

## 2022-04-15 DIAGNOSIS — F41.1 GAD (GENERALIZED ANXIETY DISORDER): ICD-10-CM

## 2022-04-15 DIAGNOSIS — Z00.00 ANNUAL PHYSICAL EXAM: Primary | ICD-10-CM

## 2022-04-15 LAB
ALBUMIN SERPL-MCNC: 4.7 G/DL (ref 3.5–5)
ALP SERPL-CCNC: 71 U/L (ref 45–120)
ALT SERPL W P-5'-P-CCNC: 57 U/L (ref 0–45)
ANION GAP SERPL CALCULATED.3IONS-SCNC: 14 MMOL/L (ref 5–18)
AST SERPL W P-5'-P-CCNC: 25 U/L (ref 0–40)
BILIRUB SERPL-MCNC: 0.9 MG/DL (ref 0–1)
BUN SERPL-MCNC: 9 MG/DL (ref 8–22)
CALCIUM SERPL-MCNC: 10.2 MG/DL (ref 8.5–10.5)
CHLORIDE BLD-SCNC: 102 MMOL/L (ref 98–107)
CHOLEST SERPL-MCNC: 186 MG/DL
CO2 SERPL-SCNC: 25 MMOL/L (ref 22–31)
CREAT SERPL-MCNC: 0.92 MG/DL (ref 0.7–1.3)
ERYTHROCYTE [DISTWIDTH] IN BLOOD BY AUTOMATED COUNT: 11.9 % (ref 10–15)
FASTING STATUS PATIENT QL REPORTED: ABNORMAL
GFR SERPL CREATININE-BSD FRML MDRD: >90 ML/MIN/1.73M2
GLUCOSE BLD-MCNC: 119 MG/DL (ref 70–125)
HCT VFR BLD AUTO: 47.7 % (ref 40–53)
HDLC SERPL-MCNC: 33 MG/DL
HGB BLD-MCNC: 17.2 G/DL (ref 13.3–17.7)
LDLC SERPL CALC-MCNC: 100 MG/DL
MCH RBC QN AUTO: 30 PG (ref 26.5–33)
MCHC RBC AUTO-ENTMCNC: 36.1 G/DL (ref 31.5–36.5)
MCV RBC AUTO: 83 FL (ref 78–100)
PLATELET # BLD AUTO: 250 10E3/UL (ref 150–450)
POTASSIUM BLD-SCNC: 4.1 MMOL/L (ref 3.5–5)
PROT SERPL-MCNC: 8.1 G/DL (ref 6–8)
RBC # BLD AUTO: 5.74 10E6/UL (ref 4.4–5.9)
SODIUM SERPL-SCNC: 141 MMOL/L (ref 136–145)
TRIGL SERPL-MCNC: 267 MG/DL
TSH SERPL DL<=0.005 MIU/L-ACNC: 1.61 UIU/ML (ref 0.3–5)
WBC # BLD AUTO: 5.9 10E3/UL (ref 4–11)

## 2022-04-15 PROCEDURE — 80053 COMPREHEN METABOLIC PANEL: CPT | Performed by: PHYSICIAN ASSISTANT

## 2022-04-15 PROCEDURE — 93010 ELECTROCARDIOGRAM REPORT: CPT | Performed by: INTERNAL MEDICINE

## 2022-04-15 PROCEDURE — 84443 ASSAY THYROID STIM HORMONE: CPT | Performed by: PHYSICIAN ASSISTANT

## 2022-04-15 PROCEDURE — 80061 LIPID PANEL: CPT | Performed by: PHYSICIAN ASSISTANT

## 2022-04-15 PROCEDURE — 93005 ELECTROCARDIOGRAM TRACING: CPT | Performed by: PHYSICIAN ASSISTANT

## 2022-04-15 PROCEDURE — 99395 PREV VISIT EST AGE 18-39: CPT | Performed by: PHYSICIAN ASSISTANT

## 2022-04-15 PROCEDURE — 99214 OFFICE O/P EST MOD 30 MIN: CPT | Mod: 25 | Performed by: PHYSICIAN ASSISTANT

## 2022-04-15 PROCEDURE — 36415 COLL VENOUS BLD VENIPUNCTURE: CPT | Performed by: PHYSICIAN ASSISTANT

## 2022-04-15 PROCEDURE — 85027 COMPLETE CBC AUTOMATED: CPT | Performed by: PHYSICIAN ASSISTANT

## 2022-04-15 ASSESSMENT — ENCOUNTER SYMPTOMS
DIZZINESS: 0
COUGH: 0
EYE ITCHING: 0
LIGHT-HEADEDNESS: 0
DIARRHEA: 0
WHEEZING: 0
FREQUENCY: 0
HEARTBURN: 0
RHINORRHEA: 0
FEVER: 0
SINUS PRESSURE: 0
NECK STIFFNESS: 0
DIAPHORESIS: 0
SORE THROAT: 0
NAUSEA: 0
CHILLS: 0
VOMITING: 0
PALPITATIONS: 0
EYE REDNESS: 0
ABDOMINAL PAIN: 0
EYE DISCHARGE: 0
APPETITE CHANGE: 0
SINUS PAIN: 0
ACTIVITY CHANGE: 0
SHORTNESS OF BREATH: 0
EYE PAIN: 0
FATIGUE: 0
HEADACHES: 0

## 2022-04-15 NOTE — PROGRESS NOTES
SUBJECTIVE:   CC: Jamir Vallejo is an 26 year old male who presents for preventative health visit.     Patient has been advised of split billing requirements and indicates understanding: Yes  Shahzad is a pleasant 26-year-old male who presents to the medic today for annual physical.  He has a past medical history of anxiety and depression.  He has not been on any medication for the last year or so and is doing well.  He feels like his anxiety and depression are stable without medications.  He has no concerns in regards to his chronic health.  He has been having some intermittent left-sided chest pain that last about 15 to 30 seconds and improves on its own.  He states it is usually when he is sitting.  He has no chest pain shortness of breath lightheaded or dizziness with activity.  The chest pain is located on the left side of the sternum but does not radiate to the arm neck or jaw.  He does not have associated lightheaded or dizziness or palpitations.  No significant family cardiac history.  Today he is feeling well and has not had the pain the last few days.  Typically he has been having it every other day.  It is only occurring once a day.  He is not a smoker.  He does not drink alcohol.    Healthy Habits:     Getting at least 3 servings of Calcium per day:  Yes    Bi-annual eye exam:  NO    Dental care twice a year:  Yes    Sleep apnea or symptoms of sleep apnea:  None    Diet:  Regular (no restrictions)    Frequency of exercise:  None    Taking medications regularly:  Yes    Medication side effects:  None    PHQ-2 Total Score: 0    Additional concerns today:  No        Today's PHQ-2 Score:   PHQ-2 ( 1999 [employer]) 4/15/2022   Q1: Little interest or pleasure in doing things 0   Q2: Feeling down, depressed or hopeless 0   PHQ-2 Score 0   Q1: Little interest or pleasure in doing things Not at all   Q2: Feeling down, depressed or hopeless Not at all   PHQ-2 Score 0       Abuse: Current or Past(Physical, Sexual or  Emotional)- No  Do you feel safe in your environment? Yes    Have you ever done Advance Care Planning? (For example, a Health Directive, POLST, or a discussion with a medical provider or your loved ones about your wishes): No, advance care planning information given to patient to review.  Advanced care planning was discussed at today's visit.    Social History     Tobacco Use     Smoking status: Never Smoker     Smokeless tobacco: Never Used   Substance Use Topics     Alcohol use: Not Currently         Alcohol Use 4/15/2022   Prescreen: >3 drinks/day or >7 drinks/week? No       Last PSA: No results found for: PSA    Reviewed orders with patient. Reviewed health maintenance and updated orders accordingly - Yes      Reviewed and updated as needed this visit by clinical staff   Tobacco  Allergies  Meds      Soc Hx          Reviewed and updated as needed this visit by Provider                   Past Medical History:   Diagnosis Date     Concussion with loss of consciousness of 30 minutes or less     Created by Conversion      Depression      Suicidal ideation      Suicide attempt (H)     suicidal ideation, no attempt      No past surgical history on file.  OB History   No obstetric history on file.       Review of Systems   Constitutional: Negative for activity change, appetite change, chills, diaphoresis, fatigue and fever.   HENT: Negative for congestion, ear discharge, ear pain, postnasal drip, rhinorrhea, sinus pressure, sinus pain and sore throat.    Eyes: Negative for pain, discharge, redness and itching.   Respiratory: Negative for cough, shortness of breath and wheezing.    Cardiovascular: Positive for chest pain. Negative for palpitations.   Gastrointestinal: Negative for abdominal pain, diarrhea, heartburn, nausea and vomiting.   Genitourinary: Negative for frequency and urgency.   Musculoskeletal: Negative for neck stiffness.   Skin: Negative for rash.   Neurological: Negative for dizziness,  "light-headedness and headaches.         OBJECTIVE:   /70 (BP Location: Left arm, Patient Position: Sitting, Cuff Size: Adult Regular)   Pulse 76   Ht 1.725 m (5' 7.91\")   Wt 71.7 kg (158 lb)   SpO2 98%   BMI 24.09 kg/m      Physical Exam  Vitals and nursing note reviewed.   Constitutional:       General: He is awake. He is not in acute distress.     Appearance: Normal appearance. He is well-developed and well-groomed. He is not ill-appearing, toxic-appearing or diaphoretic.   Neck:      Vascular: No carotid bruit or JVD.      Trachea: Trachea normal.   Cardiovascular:      Rate and Rhythm: Normal rate and regular rhythm.      Heart sounds: Heart sounds not distant. No murmur heard.    No friction rub. No gallop.   Pulmonary:      Effort: Pulmonary effort is normal. No accessory muscle usage, prolonged expiration or respiratory distress.      Breath sounds: Normal breath sounds. No decreased breath sounds, wheezing, rhonchi or rales.   Abdominal:      General: Abdomen is flat. Bowel sounds are normal.      Palpations: Abdomen is soft.      Tenderness: There is no abdominal tenderness. There is no guarding or rebound.   Musculoskeletal:      Cervical back: Full passive range of motion without pain and neck supple.      Right lower leg: No edema.      Left lower leg: No edema.   Lymphadenopathy:      Cervical: No cervical adenopathy.   Skin:     General: Skin is warm.      Findings: No rash.   Neurological:      General: No focal deficit present.      Mental Status: He is alert and oriented to person, place, and time.      GCS: GCS eye subscore is 4. GCS verbal subscore is 5. GCS motor subscore is 6.      Cranial Nerves: No cranial nerve deficit.      Motor: No weakness.      Coordination: Coordination is intact.      Gait: Gait is intact.      Deep Tendon Reflexes:      Reflex Scores:       Patellar reflexes are 2+ on the right side and 2+ on the left side.  Psychiatric:         Behavior: Behavior is " cooperative.       ASSESSMENT/PLAN:       ICD-10-CM    1. Annual physical exam  Z00.00 CBC with platelets     Comprehensive metabolic panel (BMP + Alb, Alk Phos, ALT, AST, Total. Bili, TP)     Lipid panel reflex to direct LDL Fasting     TSH     CBC with platelets     Comprehensive metabolic panel (BMP + Alb, Alk Phos, ALT, AST, Total. Bili, TP)     Lipid panel reflex to direct LDL Fasting     TSH   2. FLASH (generalized anxiety disorder)  F41.1    3. Depression, major, recurrent, severe with psychosis (H)  F33.3    4. Atypical chest pain  R07.89 TSH     XR Chest 2 Views     EKG 12-lead, tracing only     TSH     Adult Cardiology al [facility] Referral     #1 annual physical-past medical history of anxiety and depression doing well.  We will check a CBC, complete metabolic panel, lipid panel, and TSH    #2 anxiety  #3 depression  Things are going well.  He has been off of his medications for about a year or so.  He does not feel that he needs to go back on medications at this time.  His mood is doing well.    #4 atypical chest pain- he has been having intermittent chest pain in the last week or so.  Left-sided sternal chest pain that does not radiate.  Last about 15 to 30 seconds.  No associated shortness of breath, lightheaded, dizziness or syncope.  No chest pain with activity and is not provoked by activity.  The pain does not radiate anywhere.  Ulcer stable today.  He does not appear to be in any distress.  EKG was done today which shows normal sinus rhythm without any ST elevation or depression with a rate of 65.  Chest x-ray was also done today.  I do not suspect ACS at this time.  Also check a TSH today as well.  More than likely we will have him evaluated by cardiology for possible stress test to rule out cardiac involvement.  Symptoms for prompt reevaluation were discussed in detail today.  If he develops worsening chest pain or any new symptoms he is to be evaluated in the emergency department.  Patient  "agreed with this plan.  Questions were answered    #5 screening for hyperlipidemia-we will check a muscle panel today.    Patient has been advised of split billing requirements and indicates understanding: Yes    COUNSELING:   Reviewed preventive health counseling, as reflected in patient instructions       Regular exercise       Healthy diet/nutrition       Vision screening       Safe sex practices/STD prevention       Syphilis screening for high risk patients     Estimated body mass index is 24.09 kg/m  as calculated from the following:    Height as of this encounter: 1.725 m (5' 7.91\").    Weight as of this encounter: 71.7 kg (158 lb).         He reports that he has never smoked. He has never used smokeless tobacco.      Counseling Resources:  ATP IV Guidelines  Pooled Cohorts Equation Calculator  FRAX Risk Assessment  ICSI Preventive Guidelines  Dietary Guidelines for Americans, 2010  USDA's MyPlate  ASA Prophylaxis  Lung CA Screening    [facility]  "

## 2022-04-16 LAB
ATRIAL RATE - MUSE: 65 BPM
DIASTOLIC BLOOD PRESSURE - MUSE: NORMAL MMHG
INTERPRETATION ECG - MUSE: NORMAL
P AXIS - MUSE: 60 DEGREES
PR INTERVAL - MUSE: 160 MS
QRS DURATION - MUSE: 90 MS
QT - MUSE: 366 MS
QTC - MUSE: 380 MS
R AXIS - MUSE: 58 DEGREES
SYSTOLIC BLOOD PRESSURE - MUSE: NORMAL MMHG
T AXIS - MUSE: 53 DEGREES
VENTRICULAR RATE- MUSE: 65 BPM

## 2022-04-17 DIAGNOSIS — R74.8 ELEVATED LIVER ENZYMES: Primary | ICD-10-CM

## 2022-04-20 ENCOUNTER — TELEPHONE (OUTPATIENT)
Dept: FAMILY MEDICINE | Facility: CLINIC | Age: 27
End: 2022-04-20
Payer: COMMERCIAL

## 2022-04-20 NOTE — TELEPHONE ENCOUNTER
----- Message from Conner Massey PA-C sent at 4/20/2022  6:07 AM CDT -----  Please call pt with labs  Overall your labs looks stable.  CBC: White blood cell, red blood cell, platelets, and hemoglobin are stable.     CMP: Kidney function and electrolytes are stable.  One of your liver function test was just a little high. This could be due to alcohol or tylenol use more than likely. Try and watch these two things and can recheck the LFT in 1 months. Order has been placed so please call and make an appt for lab only in one months.      Lipids: Lipid panel stable.     TSH: Your thyroid function is within normal limits.

## 2022-04-20 NOTE — TELEPHONE ENCOUNTER
Left message to call back for: pt  Information to relay to patient: Please see below lab results note.

## 2022-06-08 ENCOUNTER — OFFICE VISIT (OUTPATIENT)
Dept: CARDIOLOGY | Facility: TELEHEALTH | Age: 27
End: 2022-06-08
Attending: PHYSICIAN ASSISTANT
Payer: COMMERCIAL

## 2022-06-08 VITALS
SYSTOLIC BLOOD PRESSURE: 132 MMHG | HEIGHT: 68 IN | HEART RATE: 88 BPM | DIASTOLIC BLOOD PRESSURE: 72 MMHG | WEIGHT: 159.4 LBS | RESPIRATION RATE: 16 BRPM | BODY MASS INDEX: 24.16 KG/M2

## 2022-06-08 DIAGNOSIS — R07.89 NON-CARDIAC CHEST PAIN: ICD-10-CM

## 2022-06-08 PROCEDURE — 99203 OFFICE O/P NEW LOW 30 MIN: CPT | Performed by: INTERNAL MEDICINE

## 2022-06-08 NOTE — PATIENT INSTRUCTIONS
It was a pleasure to meet with you today.      Below is a summary of your visit.   It does not sound like your chest pain is caused by your heart.   You do not need additional testing on your heart at this time.  You can continue to take ibuprofen as needed for the pain.  If it persists you can follow up with your primary physician.      Please do not hesitate to call the [facility] clinic with any questions or concerns at [phone]. You can also reach my nurse, Nora, during normal business hours at [phone].    Sincerely,

## 2022-06-08 NOTE — PROGRESS NOTES
[facility]   [address], [place] [address]   [email or web address]   OFFICE: [phone]         Thank you, Laly Herrera, for asking the [facility] team to see Mr. Jamir Vallejo to evaluate Chest Pain        Assessment/Recommendations   Assessment:    1. Non-cardiac chest pain - provided reassurance that his pain is not likely cardiac in etiology. He is at low risk for coronary artery disease and his description of the pain is not consistent with angina. Additionally, his pain is also not consistent with pericarditis.    Plan:  1. No additional cardiac evaluation recommended.  2. Okay to take PRN ibuprofen.  3. If pain persists, pursue evaluation for alternate, non-cardiac etiologies of his chest pain  4. No specific cardiac follow-up needed.         History of Present Illness   Mr. Jamir Vallejo is a 26 year old generally healthy male who presents for evaluation of chest pain.    Shahzad has been experiencing chest pain which is described as sharp and located in the middle of his chest for the proximately the past 2 months.  It comes and goes up to several times a day.  It occurs at rest while sitting at work.  Lasts 5 to 10 minutes and resolve spontaneously.  Ibuprofen has occasionally improved his pain.  He does not have any associated shortness of breath, presyncope, palpitations, or diaphoresis.  The pain is not exertional and does not occur with physical activity.  It can be worse with palpation.  He has no risk factors for premature coronary artery disease (no hypertension, hyperlipidemia, diabetes, family history of premature coronary disease, or drug use).  The pain is not positional and does not worsen with laying down or improved with sitting up.      Other than noted above, Mr. Vallejo denies any chest pain/pressure/tightness, shortness of breath at rest or with exertion, light headedness/dizziness, pre-syncope, syncope, lower  "extremity swelling, palpitations, paroxysmal nocturnal dyspnea (PND), or orthopnea.     Cardiac Problems and Cardiac Diagnostics     Most Recent Cardiac testing:  ECG dated 4/15/22 (personaly reviewed and interpreted): normal sinus rhythm. Normal ECG.         Medications  Allergies   No current outpatient medications on file.      Allergies   Allergen Reactions     Amoxicillin Rash        Physical Examination Review of Systems   Vitals: /72 (BP Location: Right arm, Patient Position: Sitting, Cuff Size: Adult Regular)   Pulse 88   Resp 16   Ht 1.727 m (5' 8\")   Wt 72.3 kg (159 lb 6.4 oz)   BMI 24.24 kg/m    BMI= Body mass index is 24.24 kg/m .  Wt Readings from Last 3 Encounters:   06/08/22 72.3 kg (159 lb 6.4 oz)   04/15/22 71.7 kg (158 lb)   12/30/19 62.1 kg (137 lb)       General Appearance:   Pleasant male, appears stated age. no acute distress, normal body habitus   ENT/Mouth: membranes moist, no apparent gingival bleeding.      EYES:  no scleral icterus, normal conjunctivae   Neck: no carotid bruits. supple   Respiratory:   lungs are clear to auscultation, no rales or wheezing, equal chest wall expansion    Cardiovascular:   Regular rhythm, normal rate. Normal first and second heart sounds with no murmurs, rubs, or gallops; the carotid, radial and posterior tibial pulses are intact, Jugular venous pressure normal, no edema bilaterally    Abdomen/GI:  Soft, non-tender   Extremities: no cyanosis or clubbing   Skin: no xanthelasma, warm.    Heme/lymph/ Immunology No apparent bleeding noted.   Neurologic: Alert and oriented. normal gait, no tremors   Psychiatric: Pleasant, calm, appropriate affect.         Please refer above for cardiac ROS details.       Past History   Past Medical History:   Past Medical History:   Diagnosis Date     Concussion with loss of consciousness of 30 minutes or less     Created by Conversion      Depression      Suicidal ideation      Suicide attempt (H)     suicidal " ideation, no attempt        Past Surgical History: History reviewed. No pertinent surgical history.     Family History:   Family History   Problem Relation Age of Onset     No Known Problems Mother      No Known Problems Father      Anxiety Disorder Sister         Social History:   Social History     Socioeconomic History     Marital status: Single     Spouse name: Not on file     Number of children: Not on file     Years of education: Not on file     Highest education level: Not on file   Occupational History     Not on file   Tobacco Use     Smoking status: Never Smoker     Smokeless tobacco: Never Used   Vaping Use     Vaping Use: Never used   Substance and Sexual Activity     Alcohol use: Not Currently     Drug use: Never     Sexual activity: Not Currently     Partners: Female, Male     Birth control/protection: Condom   Other Topics Concern     Not on file   Social History Narrative    Lives with parents      Social Determinants of Health     Financial Resource Strain: Not on file   Food Insecurity: Not on file   Transportation Needs: Not on file   Physical Activity: Not on file   Stress: Not on file   Social Connections: Not on file   Intimate Partner Violence: Not on file   Housing Stability: Not on file            Lab Results    Chemistry/lipid CBC Cardiac Enzymes/BNP/TSH/INR   Lab Results   Component Value Date    CHOL 186 04/15/2022    HDL 33 (L) 04/15/2022    TRIG 267 (H) 04/15/2022    BUN 9 04/15/2022     04/15/2022    CO2 25 04/15/2022    Lab Results   Component Value Date    WBC 5.9 04/15/2022    HGB 17.2 04/15/2022    HCT 47.7 04/15/2022    MCV 83 04/15/2022     04/15/2022    Lab Results   Component Value Date    TSH 1.61 04/15/2022

## 2022-06-08 NOTE — LETTER
6/8/2022    MARILY PATEL  [address]  [place] [address]    RE: Jamir Vallejo       Dear Colleague,     I had the pleasure of seeing Jamir Vallejo in the [facility].    [facility]   [address], [place] [address]   [email or web address]   OFFICE: [phone]         Thank you, Dr. Patel, Marily BOYER, for asking the [facility] team to see Mr. Jamir Vallejo to evaluate Chest Pain        Assessment/Recommendations   Assessment:    1. Non-cardiac chest pain - provided reassurance that his pain is not likely cardiac in etiology. He is at low risk for coronary artery disease and his description of the pain is not consistent with angina. Additionally, his pain is also not consistent with pericarditis.    Plan:  1. No additional cardiac evaluation recommended.  2. Okay to take PRN ibuprofen.  3. If pain persists, pursue evaluation for alternate, non-cardiac etiologies of his chest pain  4. No specific cardiac follow-up needed.         History of Present Illness   Mr. Jamir Vallejo is a 26 year old generally healthy male who presents for evaluation of chest pain.    Shahzad has been experiencing chest pain which is described as sharp and located in the middle of his chest for the proximately the past 2 months.  It comes and goes up to several times a day.  It occurs at rest while sitting at work.  Lasts 5 to 10 minutes and resolve spontaneously.  Ibuprofen has occasionally improved his pain.  He does not have any associated shortness of breath, presyncope, palpitations, or diaphoresis.  The pain is not exertional and does not occur with physical activity.  It can be worse with palpation.  He has no risk factors for premature coronary artery disease (no hypertension, hyperlipidemia, diabetes, family history of premature coronary disease, or drug use).  The pain is not positional and does not worsen with laying down  "or improved with sitting up.      Other than noted above, Mr. Vallejo denies any chest pain/pressure/tightness, shortness of breath at rest or with exertion, light headedness/dizziness, pre-syncope, syncope, lower extremity swelling, palpitations, paroxysmal nocturnal dyspnea (PND), or orthopnea.     Cardiac Problems and Cardiac Diagnostics     Most Recent Cardiac testing:  ECG dated 4/15/22 (personaly reviewed and interpreted): normal sinus rhythm. Normal ECG.         Medications  Allergies   No current outpatient medications on file.      Allergies   Allergen Reactions     Amoxicillin Rash        Physical Examination Review of Systems   Vitals: /72 (BP Location: Right arm, Patient Position: Sitting, Cuff Size: Adult Regular)   Pulse 88   Resp 16   Ht 1.727 m (5' 8\")   Wt 72.3 kg (159 lb 6.4 oz)   BMI 24.24 kg/m    BMI= Body mass index is 24.24 kg/m .  Wt Readings from Last 3 Encounters:   06/08/22 72.3 kg (159 lb 6.4 oz)   04/15/22 71.7 kg (158 lb)   12/30/19 62.1 kg (137 lb)       General Appearance:   Pleasant male, appears stated age. no acute distress, normal body habitus   ENT/Mouth: membranes moist, no apparent gingival bleeding.      EYES:  no scleral icterus, normal conjunctivae   Neck: no carotid bruits. supple   Respiratory:   lungs are clear to auscultation, no rales or wheezing, equal chest wall expansion    Cardiovascular:   Regular rhythm, normal rate. Normal first and second heart sounds with no murmurs, rubs, or gallops; the carotid, radial and posterior tibial pulses are intact, Jugular venous pressure normal, no edema bilaterally    Abdomen/GI:  Soft, non-tender   Extremities: no cyanosis or clubbing   Skin: no xanthelasma, warm.    Heme/lymph/ Immunology No apparent bleeding noted.   Neurologic: Alert and oriented. normal gait, no tremors   Psychiatric: Pleasant, calm, appropriate affect.         Please refer above for cardiac ROS details.       Past History   Past Medical History: "   Past Medical History:   Diagnosis Date     Concussion with loss of consciousness of 30 minutes or less     Created by Conversion      Depression      Suicidal ideation      Suicide attempt (H)     suicidal ideation, no attempt        Past Surgical History: History reviewed. No pertinent surgical history.     Family History:   Family History   Problem Relation Age of Onset     No Known Problems Mother      No Known Problems Father      Anxiety Disorder Sister         Social History:   Social History     Socioeconomic History     Marital status: Single     Spouse name: Not on file     Number of children: Not on file     Years of education: Not on file     Highest education level: Not on file   Occupational History     Not on file   Tobacco Use     Smoking status: Never Smoker     Smokeless tobacco: Never Used   Vaping Use     Vaping Use: Never used   Substance and Sexual Activity     Alcohol use: Not Currently     Drug use: Never     Sexual activity: Not Currently     Partners: Female, Male     Birth control/protection: Condom   Other Topics Concern     Not on file   Social History Narrative    Lives with parents      Social Determinants of Health     Financial Resource Strain: Not on file   Food Insecurity: Not on file   Transportation Needs: Not on file   Physical Activity: Not on file   Stress: Not on file   Social Connections: Not on file   Intimate Partner Violence: Not on file   Housing Stability: Not on file            Lab Results    Chemistry/lipid CBC Cardiac Enzymes/BNP/TSH/INR   Lab Results   Component Value Date    CHOL 186 04/15/2022    HDL 33 (L) 04/15/2022    TRIG 267 (H) 04/15/2022    BUN 9 04/15/2022     04/15/2022    CO2 25 04/15/2022    Lab Results   Component Value Date    WBC 5.9 04/15/2022    HGB 17.2 04/15/2022    HCT 47.7 04/15/2022    MCV 83 04/15/2022     04/15/2022    Lab Results   Component Value Date    TSH 1.61 04/15/2022                Thank you for allowing me to  participate in the care of your patient.      Sincerely,     Jan Manning MD     [facility]  cc:   Conner Massey PA-C  [address] [address]

## 2022-07-12 ASSESSMENT — ANXIETY QUESTIONNAIRES
5. BEING SO RESTLESS THAT IT IS HARD TO SIT STILL: MORE THAN HALF THE DAYS
GAD7 TOTAL SCORE: 15
4. TROUBLE RELAXING: MORE THAN HALF THE DAYS
3. WORRYING TOO MUCH ABOUT DIFFERENT THINGS: MORE THAN HALF THE DAYS
7. FEELING AFRAID AS IF SOMETHING AWFUL MIGHT HAPPEN: MORE THAN HALF THE DAYS
GAD7 TOTAL SCORE: 15
GAD7 TOTAL SCORE: 15
1. FEELING NERVOUS, ANXIOUS, OR ON EDGE: MORE THAN HALF THE DAYS
7. FEELING AFRAID AS IF SOMETHING AWFUL MIGHT HAPPEN: MORE THAN HALF THE DAYS
6. BECOMING EASILY ANNOYED OR IRRITABLE: MORE THAN HALF THE DAYS
8. IF YOU CHECKED OFF ANY PROBLEMS, HOW DIFFICULT HAVE THESE MADE IT FOR YOU TO DO YOUR WORK, TAKE CARE OF THINGS AT HOME, OR GET ALONG WITH OTHER PEOPLE?: VERY DIFFICULT
2. NOT BEING ABLE TO STOP OR CONTROL WORRYING: NEARLY EVERY DAY

## 2022-07-12 ASSESSMENT — PATIENT HEALTH QUESTIONNAIRE - PHQ9
10. IF YOU CHECKED OFF ANY PROBLEMS, HOW DIFFICULT HAVE THESE PROBLEMS MADE IT FOR YOU TO DO YOUR WORK, TAKE CARE OF THINGS AT HOME, OR GET ALONG WITH OTHER PEOPLE: VERY DIFFICULT
SUM OF ALL RESPONSES TO PHQ QUESTIONS 1-9: 17
SUM OF ALL RESPONSES TO PHQ QUESTIONS 1-9: 17

## 2022-07-13 ENCOUNTER — OFFICE VISIT (OUTPATIENT)
Dept: FAMILY MEDICINE | Facility: CLINIC | Age: 27
End: 2022-07-13
Payer: COMMERCIAL

## 2022-07-13 VITALS
DIASTOLIC BLOOD PRESSURE: 82 MMHG | BODY MASS INDEX: 23.11 KG/M2 | HEART RATE: 88 BPM | SYSTOLIC BLOOD PRESSURE: 122 MMHG | RESPIRATION RATE: 12 BRPM | OXYGEN SATURATION: 98 % | WEIGHT: 152 LBS

## 2022-07-13 DIAGNOSIS — F33.3 DEPRESSION, MAJOR, RECURRENT, SEVERE WITH PSYCHOSIS (H): ICD-10-CM

## 2022-07-13 DIAGNOSIS — F41.1 GAD (GENERALIZED ANXIETY DISORDER): Primary | ICD-10-CM

## 2022-07-13 PROCEDURE — 99214 OFFICE O/P EST MOD 30 MIN: CPT | Performed by: PHYSICIAN ASSISTANT

## 2022-07-13 RX ORDER — HYDROXYZINE PAMOATE 25 MG/1
25 CAPSULE ORAL 3 TIMES DAILY PRN
Qty: 60 CAPSULE | Refills: 1 | Status: SHIPPED | OUTPATIENT
Start: 2022-07-13 | End: 2022-08-09

## 2022-07-13 RX ORDER — ESCITALOPRAM OXALATE 20 MG/1
20 TABLET ORAL DAILY
Qty: 30 TABLET | Refills: 1 | Status: SHIPPED | OUTPATIENT
Start: 2022-07-13 | End: 2022-08-09

## 2022-07-13 ASSESSMENT — ENCOUNTER SYMPTOMS
CONSTITUTIONAL NEGATIVE: 1
NERVOUS/ANXIOUS: 1
HALLUCINATIONS: 0
SLEEP DISTURBANCE: 1
DYSPHORIC MOOD: 1
CARDIOVASCULAR NEGATIVE: 1
HYPERACTIVE: 0
RESPIRATORY NEGATIVE: 1
DECREASED CONCENTRATION: 1
MUSCULOSKELETAL NEGATIVE: 1
CONFUSION: 0
AGITATION: 0

## 2022-07-13 ASSESSMENT — ANXIETY QUESTIONNAIRES: GAD7 TOTAL SCORE: 15

## 2022-07-13 ASSESSMENT — PATIENT HEALTH QUESTIONNAIRE - PHQ9
10. IF YOU CHECKED OFF ANY PROBLEMS, HOW DIFFICULT HAVE THESE PROBLEMS MADE IT FOR YOU TO DO YOUR WORK, TAKE CARE OF THINGS AT HOME, OR GET ALONG WITH OTHER PEOPLE: VERY DIFFICULT
SUM OF ALL RESPONSES TO PHQ QUESTIONS 1-9: 17

## 2022-07-13 ASSESSMENT — PAIN SCALES - GENERAL: PAINLEVEL: NO PAIN (0)

## 2022-07-13 NOTE — PROGRESS NOTES
Chief Complaint   Patient presents with     Depression     Has not been seen recently regarding depression. Having severe depression, anxiety, and panic attacks. Has been having panic attacks once a week for 3-4 weeks. Pt denies having any significant life events happen recently. Nothing has helped or made it worse. Has taken sertraline in the past and did not find relief.        Assessment & Plan     ICD-10-CM    1. FLASH (generalized anxiety disorder)  F41.1 hydrOXYzine (VISTARIL) 25 MG capsule     escitalopram (LEXAPRO) 20 MG tablet   2. Depression, major, recurrent, severe with psychosis (H)  F33.3 hydrOXYzine (VISTARIL) 25 MG capsule     escitalopram (LEXAPRO) 20 MG tablet     #1 anxiety  #2 depression  His anxiety and depression is not well controlled at this time.  The last 2 or 3 weeks he has been having panic attacks and worsening depression.  He has been having decreased motivation decreased appetite decreased focus and overall mood has been down.  He denies any suicidal thoughts.  No thoughts of harming himself or others.  No intent.  We did discuss if he develops these thoughts he is to call 911 and to be seen in the emergency department for evaluation.  We will start him on Lexapro 10 mg for 14 days and then increase to 20 mg thereafter.  Side effects of the medication were discussed in detail.  We will also restart him on hydroxyzine 25 mg 3 times daily as needed for anxiety.  I did recommend following up in 4 to 6 weeks for recheck on anxiety and depression.  Sooner if needed.  Patient agreed to this plan.  Questions were answered.    [id number]     Depression Screening Follow Up    PHQ 7/12/2022   PHQ-9 Total Score 17   Q9: Thoughts of better off dead/self-harm past 2 weeks Several days   F/U: Thoughts of suicide or self-harm No   F/U: Safety concerns No     Last PHQ-9 7/12/2022   1.  Little interest or pleasure in doing things 2   2.  Feeling down, depressed, or hopeless 2   3.  Trouble falling or staying  asleep, or sleeping too much 2   4.  Feeling tired or having little energy 2   5.  Poor appetite or overeating 3   6.  Feeling bad about yourself 2   7.  Trouble concentrating 2   8.  Moving slowly or restless 1   Q9: Thoughts of better off dead/self-harm past 2 weeks 1   PHQ-9 Total Score 17   Difficulty at work, home, or with people -   In the past two weeks have you had thoughts of suicide or self harm? No   Do you have concerns about your personal safety or the safety of others? No           Follow Up      Follow Up Actions Taken  Crisis resource information provided in the After Visit Summary  Patient to follow up with PCP.  Clinic staff to schedule appointment if able.     Follow-up Visit   Expected date:  Jan 13, 2023 (Approximate)      Follow Up Appointment Details:     Follow-up with whom?: Me    Follow-Up for what?: Chronic Disease f/u    Chronic Disease f/u: Anxiety    How?: In Person                    No follow-ups on file.   Follow-up Visit   Expected date:  Jan 13, 2023 (Approximate)      Follow Up Appointment Details:     Follow-up with whom?: Me    Follow-Up for what?: Chronic Disease f/u    Chronic Disease f/u: Anxiety    How?: In Person                    [facility]    Seda Pike is a 26 year old, presenting for the following health issues:  Depression (Has not been seen recently regarding depression. Having severe depression, anxiety, and panic attacks. Has been having panic attacks once a week for 3-4 weeks. Pt denies having any significant life events happen recently. Nothing has helped or made it worse. Has taken sertraline in the past and did not find relief. )      Shahzad is a pleasant 26-year-old male who presents to the clinic today for evaluation on anxiety and depression.  He has had longstanding anxiety and depression for many years.  He has been off of all antidepressants for 2 years as he was doing well.  The last month or so he  has been feeling down, hopeless, depressed.  He is also been having a panic attacks.  Decreased appetite decreased focus and decreased motivation.  He has had suicidal thoughts in the past but has not had any thoughts of harming himself or others recently.  No intent.  He has been on multiple SSRIs in the past 2 of which were Zoloft and Celexa.  He had nausea and could not sleep with Zoloft and was having erectile dysfunction with the Celexa.  He was on hydroxyzine in the past for anxiety attacks which did help.    History of Present Illness       Mental Health Follow-up:  Patient presents to follow-up on Depression & Anxiety.Patient's depression since last visit has been:  Good  The patient is not having other symptoms associated with depression.  Patient's anxiety since last visit has been:  Good  The patient is not having other symptoms associated with anxiety.  Any significant life events: other  Patient is feeling anxious or having panic attacks.  Patient has no concerns about alcohol or drug use.He consumes 2 sweetened beverage(s) daily.He exercises with enough effort to increase his heart rate 20 to 29 minutes per day.  He exercises with enough effort to increase his heart rate 3 or less days per week. He is missing 7 dose(s) of medications per week.  He is not taking prescribed medications regularly due to other.    Today's PHQ-9         PHQ-9 Total Score: 17    PHQ-9 Q9 Thoughts of better off dead/self-harm past 2 weeks :   Several days  Thoughts of suicide or self harm: (P) No  Self-harm Plan:     Self-harm Action:       Safety concerns for self or others: (P) No    How difficult have these problems made it for you to do your work, take care of things at home, or get along with other people: Very difficult  Today's FLASH-7 Score: 15     {    Review of Systems   Constitutional: Negative.    HENT: Negative.    Respiratory: Negative.    Cardiovascular: Negative.    Genitourinary: Negative.    Musculoskeletal:  Negative.    Psychiatric/Behavioral: Positive for decreased concentration, dysphoric mood, mood changes and sleep disturbance. Negative for agitation, behavioral problems, confusion, hallucinations, self-injury and suicidal ideas. The patient is nervous/anxious. The patient is not hyperactive.             Objective    /82 (BP Location: Left arm, Patient Position: Sitting, Cuff Size: Adult Regular)   Pulse 88   Resp 12   Wt 68.9 kg (152 lb)   SpO2 98%   BMI 23.11 kg/m    Body mass index is 23.11 kg/m .  Physical Exam  Vitals and nursing note reviewed.   Constitutional:       Appearance: Normal appearance.   HENT:      Head: Normocephalic and atraumatic.      Mouth/Throat:      Mouth: Mucous membranes are moist.      Pharynx: No oropharyngeal exudate or posterior oropharyngeal erythema.   Eyes:      Conjunctiva/sclera: Conjunctivae normal.   Cardiovascular:      Rate and Rhythm: Normal rate and regular rhythm.      Heart sounds: No murmur heard.    No friction rub. No gallop.   Pulmonary:      Effort: Pulmonary effort is normal.      Breath sounds: No wheezing, rhonchi or rales.   Musculoskeletal:      Cervical back: Neck supple.   Neurological:      General: No focal deficit present.      Mental Status: He is alert and oriented to person, place, and time.      GCS: GCS eye subscore is 4. GCS verbal subscore is 5. GCS motor subscore is 6.      Gait: Gait is intact.   Psychiatric:         Attention and Perception: Attention and perception normal.         Mood and Affect: Mood is not depressed. Affect is not labile.         Thought Content: Thought content normal. Thought content does not include homicidal or suicidal ideation. Thought content does not include homicidal or suicidal plan.         Cognition and Memory: Cognition and memory normal.         Judgment: Judgment normal.

## 2022-08-08 ENCOUNTER — MYC MEDICAL ADVICE (OUTPATIENT)
Dept: FAMILY MEDICINE | Facility: CLINIC | Age: 27
End: 2022-08-08

## 2022-08-08 DIAGNOSIS — F33.3 DEPRESSION, MAJOR, RECURRENT, SEVERE WITH PSYCHOSIS (H): ICD-10-CM

## 2022-08-08 DIAGNOSIS — F41.1 GAD (GENERALIZED ANXIETY DISORDER): ICD-10-CM

## 2022-08-09 RX ORDER — ESCITALOPRAM OXALATE 20 MG/1
20 TABLET ORAL DAILY
Qty: 90 TABLET | Refills: 1 | Status: SHIPPED | OUTPATIENT
Start: 2022-08-09 | End: 2022-12-26

## 2022-08-09 RX ORDER — ESCITALOPRAM OXALATE 20 MG/1
20 TABLET ORAL DAILY
Qty: 30 TABLET | Refills: 1 | OUTPATIENT
Start: 2022-08-09

## 2022-08-09 RX ORDER — HYDROXYZINE PAMOATE 25 MG/1
25 CAPSULE ORAL 3 TIMES DAILY PRN
Qty: 90 CAPSULE | Refills: 1 | Status: SHIPPED | OUTPATIENT
Start: 2022-08-09 | End: 2024-05-08

## 2022-10-27 ENCOUNTER — TRANSFERRED RECORDS (OUTPATIENT)
Dept: HEALTH INFORMATION MANAGEMENT | Facility: CLINIC | Age: 27
End: 2022-10-27

## 2022-12-26 ENCOUNTER — OFFICE VISIT (OUTPATIENT)
Dept: FAMILY MEDICINE | Facility: CLINIC | Age: 27
End: 2022-12-26
Payer: COMMERCIAL

## 2022-12-26 VITALS
TEMPERATURE: 98.3 F | BODY MASS INDEX: 25.61 KG/M2 | HEART RATE: 88 BPM | SYSTOLIC BLOOD PRESSURE: 112 MMHG | HEIGHT: 68 IN | WEIGHT: 169 LBS | DIASTOLIC BLOOD PRESSURE: 66 MMHG | OXYGEN SATURATION: 98 % | RESPIRATION RATE: 14 BRPM

## 2022-12-26 DIAGNOSIS — F39 MOOD DISORDER (H): ICD-10-CM

## 2022-12-26 DIAGNOSIS — F33.3 DEPRESSION, MAJOR, RECURRENT, SEVERE WITH PSYCHOSIS (H): ICD-10-CM

## 2022-12-26 DIAGNOSIS — F41.1 GAD (GENERALIZED ANXIETY DISORDER): Primary | ICD-10-CM

## 2022-12-26 PROCEDURE — 99214 OFFICE O/P EST MOD 30 MIN: CPT | Performed by: PHYSICIAN ASSISTANT

## 2022-12-26 RX ORDER — BUPROPION HYDROCHLORIDE 150 MG/1
150 TABLET ORAL EVERY MORNING
Qty: 30 TABLET | Refills: 1 | Status: SHIPPED | OUTPATIENT
Start: 2022-12-26 | End: 2023-08-25

## 2022-12-26 RX ORDER — ESCITALOPRAM OXALATE 20 MG/1
20 TABLET ORAL DAILY
Qty: 90 TABLET | Refills: 1 | Status: SHIPPED | OUTPATIENT
Start: 2022-12-26 | End: 2023-08-25

## 2022-12-26 ASSESSMENT — ANXIETY QUESTIONNAIRES
GAD7 TOTAL SCORE: 5
GAD7 TOTAL SCORE: 5
7. FEELING AFRAID AS IF SOMETHING AWFUL MIGHT HAPPEN: NOT AT ALL
4. TROUBLE RELAXING: SEVERAL DAYS
IF YOU CHECKED OFF ANY PROBLEMS ON THIS QUESTIONNAIRE, HOW DIFFICULT HAVE THESE PROBLEMS MADE IT FOR YOU TO DO YOUR WORK, TAKE CARE OF THINGS AT HOME, OR GET ALONG WITH OTHER PEOPLE: SOMEWHAT DIFFICULT
GAD7 TOTAL SCORE: 5
7. FEELING AFRAID AS IF SOMETHING AWFUL MIGHT HAPPEN: NOT AT ALL
3. WORRYING TOO MUCH ABOUT DIFFERENT THINGS: SEVERAL DAYS
5. BEING SO RESTLESS THAT IT IS HARD TO SIT STILL: NOT AT ALL
2. NOT BEING ABLE TO STOP OR CONTROL WORRYING: SEVERAL DAYS
1. FEELING NERVOUS, ANXIOUS, OR ON EDGE: SEVERAL DAYS
8. IF YOU CHECKED OFF ANY PROBLEMS, HOW DIFFICULT HAVE THESE MADE IT FOR YOU TO DO YOUR WORK, TAKE CARE OF THINGS AT HOME, OR GET ALONG WITH OTHER PEOPLE?: SOMEWHAT DIFFICULT
6. BECOMING EASILY ANNOYED OR IRRITABLE: SEVERAL DAYS

## 2022-12-26 ASSESSMENT — ENCOUNTER SYMPTOMS
GASTROINTESTINAL NEGATIVE: 1
NERVOUS/ANXIOUS: 1
RESPIRATORY NEGATIVE: 1
MUSCULOSKELETAL NEGATIVE: 1
NEUROLOGICAL NEGATIVE: 1

## 2022-12-26 ASSESSMENT — PATIENT HEALTH QUESTIONNAIRE - PHQ9
10. IF YOU CHECKED OFF ANY PROBLEMS, HOW DIFFICULT HAVE THESE PROBLEMS MADE IT FOR YOU TO DO YOUR WORK, TAKE CARE OF THINGS AT HOME, OR GET ALONG WITH OTHER PEOPLE: VERY DIFFICULT
SUM OF ALL RESPONSES TO PHQ QUESTIONS 1-9: 13
SUM OF ALL RESPONSES TO PHQ QUESTIONS 1-9: 13

## 2022-12-26 NOTE — PROGRESS NOTES
"  Assessment & Plan       ICD-10-CM    1. FLASH (generalized anxiety disorder)  F41.1 buPROPion (WELLBUTRIN XL) 150 MG 24 hr tablet     Adult Mental Health  Referral     escitalopram (LEXAPRO) 20 MG tablet      2. Mood disorder (H)  F39 buPROPion (WELLBUTRIN XL) 150 MG 24 hr tablet     Adult Mental Health  Referral      3. Depression, major, recurrent, severe with psychosis (H)  F33.3 escitalopram (LEXAPRO) 20 MG tablet        #1 anxiety  #2 depression  -Lexapro 20 mg  -Wellbutrin 150 mg  We will have him continue the Lexapro and add on Wellbutrin to his regimen.  Side effects of the Wellbutrin were discussed.  Continue hydroxyzine as needed.  Ambulatory referral to counseling has been placed.  He denies any cystitis suicidal ideations.  If he develops new symptoms he is to be evaluated.  He is to send me a [employer] message in 4 to 6 weeks on how his mood is doing with starting the Wellbutrin.       BMI:   Estimated body mass index is 25.47 kg/m  as calculated from the following:    Height as of this encounter: 1.735 m (5' 8.31\").    Weight as of this encounter: 76.7 kg (169 lb).         No follow-ups on file.    [facility]    Seda Pike is a 27 year old, presenting for the following health issues:  Anxiety (Anxiety and depression concerns. Currently on Lexapro but possibly needs increase or change in meds. Nothing triggers anxiety/depression, just occurs.)      Shahzad is a pleasant 27-year-old male who presents the clinic today for evaluation of anxiety and depression.  He was started on Lexapro 20 mg in April 2022 for anxiety and depression.  He feels that his mood has been quite well up until early December.  Early December he noticed he has had decreased motivation,, overly fatigued, and just decrease move overall.  He feels that the anxiety aspect has been doing quite well since he has been on the Lexapro.  He is taking hydroxyzine as " "needed but is very seldomly using this.  He denies any thoughts of harming himself or others.  No suicidal ideations.  He also is hoping to get counseling started and has done this in the past and it was helpful.    Anxiety    History of Present Illness       Mental Health Follow-up:  Patient presents to follow-up on Depression.Patient's depression since last visit has been:  Medium  The patient is not having other symptoms associated with depression.      Any significant life events: other  Patient is not feeling anxious or having panic attacks.  Patient has no concerns about alcohol or drug use.    He eats 0-1 servings of fruits and vegetables daily.He consumes 1 sweetened beverage(s) daily.He exercises with enough effort to increase his heart rate 9 or less minutes per day.  He exercises with enough effort to increase his heart rate 3 or less days per week.   He is taking medications regularly.    Today's PHQ-9         PHQ-9 Total Score: 13    PHQ-9 Q9 Thoughts of better off dead/self-harm past 2 weeks :   Not at all    How difficult have these problems made it for you to do your work, take care of things at home, or get along with other people: Very difficult  Today's FLASH-7 Score: 5         Review of Systems   HENT: Negative.    Respiratory: Negative.    Gastrointestinal: Negative.    Genitourinary: Negative.    Musculoskeletal: Negative.    Neurological: Negative.    Psychiatric/Behavioral: The patient is nervous/anxious.             Objective    /66 (BP Location: Left arm, Patient Position: Sitting, Cuff Size: Adult Regular)   Pulse 88   Temp 98.3  F (36.8  C) (Oral)   Resp 14   Ht 1.735 m (5' 8.31\")   Wt 76.7 kg (169 lb)   SpO2 98%   BMI 25.47 kg/m    Body mass index is 25.47 kg/m .  Physical Exam  Vitals and nursing note reviewed.   Constitutional:       General: He is not in acute distress.     Appearance: Normal appearance. He is not ill-appearing, toxic-appearing or diaphoretic.   HENT:      " Head: Normocephalic and atraumatic.   Eyes:      Conjunctiva/sclera: Conjunctivae normal.   Cardiovascular:      Rate and Rhythm: Normal rate.      Heart sounds: No murmur heard.    No friction rub. No gallop.   Pulmonary:      Effort: Pulmonary effort is normal.      Breath sounds: No wheezing, rhonchi or rales.   Skin:     General: Skin is warm and dry.   Neurological:      Mental Status: He is alert.   Psychiatric:         Mood and Affect: Mood normal.         Behavior: Behavior normal.         Thought Content: Thought content normal.         Judgment: Judgment normal.

## 2022-12-27 ENCOUNTER — TELEPHONE (OUTPATIENT)
Dept: NEUROLOGY | Facility: CLINIC | Age: 27
End: 2022-12-27

## 2022-12-27 NOTE — TELEPHONE ENCOUNTER
Returned call to Rebeca, informed that Dr. Fernandez is not the correct provider for patients specific needs.     Provided scheduling number [phone] for patient/mother to contact to schedule with appropriate mental health provider. They are agreeable to plan.     Miguel Gay RN, BSN  [facility]

## 2022-12-27 NOTE — TELEPHONE ENCOUNTER
Pt's Mother Rebeca called wanting patient to be seen for anxiety and depression. He is on medications and had an incident at work. Who should patient schedule with? Mother states she left a message last Wed and never got a call back. Writer of this message never heard any message from her. I apologized for her not getting a call back. She would appreciate a call back today. [phone]. There is a Consent to Communicate form in his registration forms.

## 2022-12-27 NOTE — TELEPHONE ENCOUNTER
Dr. Fernandez- patients PCP placed a mental health referral for generalized anxiety disorder, mood disorder, and depression.     Would this patient be appropriate for you to see? I wanted to check with you before scheduling.     Thank you  Miguel Gay RN, BSN  [facility]

## 2023-01-04 ENCOUNTER — MYC MEDICAL ADVICE (OUTPATIENT)
Dept: FAMILY MEDICINE | Facility: CLINIC | Age: 28
End: 2023-01-04

## 2023-01-05 ENCOUNTER — TELEPHONE (OUTPATIENT)
Dept: FAMILY MEDICINE | Facility: CLINIC | Age: 28
End: 2023-01-05

## 2023-01-05 NOTE — TELEPHONE ENCOUNTER
Forms Request  Name of form/paperwork:(FMLA)  Have you been seen for this request: N/A  Do we have the form: yes, in ([employer]) inbox  When is form needed by: MUST BE DONE BY 1/19/2023  How would you like the form returned: IN PERSON   Patient Notified form requests are processed in 3-5 business days: n/a         Okay to leave a detailed message? YES

## 2023-01-05 NOTE — TELEPHONE ENCOUNTER
Form prepped and up for ML to review, complete and sign.    Arianna Larsen MA on 1/5/2023 at 1:55 PM

## 2023-01-11 NOTE — TELEPHONE ENCOUNTER
Left message to call back for: pt  Information to relay to patient: paperwork ready. Original up front for . Can mail to him if that is what is preferred. Or we can fax, please get a number if so.    Copy sent to be scanned and copy made for provider gray folder

## 2023-04-20 ENCOUNTER — PATIENT OUTREACH (OUTPATIENT)
Dept: CARE COORDINATION | Facility: CLINIC | Age: 28
End: 2023-04-20
Payer: COMMERCIAL

## 2023-06-02 ENCOUNTER — HEALTH MAINTENANCE LETTER (OUTPATIENT)
Age: 28
End: 2023-06-02

## 2023-08-25 ENCOUNTER — OFFICE VISIT (OUTPATIENT)
Dept: FAMILY MEDICINE | Facility: CLINIC | Age: 28
End: 2023-08-25
Payer: COMMERCIAL

## 2023-08-25 VITALS
WEIGHT: 184 LBS | BODY MASS INDEX: 27.89 KG/M2 | SYSTOLIC BLOOD PRESSURE: 110 MMHG | HEIGHT: 68 IN | HEART RATE: 97 BPM | RESPIRATION RATE: 12 BRPM | DIASTOLIC BLOOD PRESSURE: 72 MMHG | OXYGEN SATURATION: 97 % | TEMPERATURE: 98.8 F

## 2023-08-25 DIAGNOSIS — F41.1 GAD (GENERALIZED ANXIETY DISORDER): ICD-10-CM

## 2023-08-25 DIAGNOSIS — F33.3 DEPRESSION, MAJOR, RECURRENT, SEVERE WITH PSYCHOSIS (H): ICD-10-CM

## 2023-08-25 DIAGNOSIS — R74.8 ELEVATED LIVER ENZYMES: ICD-10-CM

## 2023-08-25 DIAGNOSIS — Z00.00 ANNUAL PHYSICAL EXAM: Primary | ICD-10-CM

## 2023-08-25 PROCEDURE — 99213 OFFICE O/P EST LOW 20 MIN: CPT | Mod: 25 | Performed by: PHYSICIAN ASSISTANT

## 2023-08-25 PROCEDURE — 90715 TDAP VACCINE 7 YRS/> IM: CPT | Performed by: PHYSICIAN ASSISTANT

## 2023-08-25 PROCEDURE — 96127 BRIEF EMOTIONAL/BEHAV ASSMT: CPT | Performed by: PHYSICIAN ASSISTANT

## 2023-08-25 PROCEDURE — 90471 IMMUNIZATION ADMIN: CPT | Performed by: PHYSICIAN ASSISTANT

## 2023-08-25 PROCEDURE — 99385 PREV VISIT NEW AGE 18-39: CPT | Mod: 25 | Performed by: PHYSICIAN ASSISTANT

## 2023-08-25 RX ORDER — BUPROPION HYDROCHLORIDE 150 MG/1
150 TABLET ORAL EVERY MORNING
Qty: 90 TABLET | Refills: 3 | Status: SHIPPED | OUTPATIENT
Start: 2023-08-25 | End: 2024-05-08

## 2023-08-25 RX ORDER — ESCITALOPRAM OXALATE 20 MG/1
20 TABLET ORAL DAILY
Qty: 90 TABLET | Refills: 3 | Status: SHIPPED | OUTPATIENT
Start: 2023-08-25 | End: 2024-05-08

## 2023-08-25 ASSESSMENT — ENCOUNTER SYMPTOMS
ENDOCRINE NEGATIVE: 1
DECREASED CONCENTRATION: 0
WHEEZING: 0
PARESTHESIAS: 0
CHILLS: 0
ARTHRALGIAS: 0
NAUSEA: 0
WEAKNESS: 0
SORE THROAT: 0
CHOKING: 0
SHORTNESS OF BREATH: 0
JOINT SWELLING: 0
FEVER: 0
MYALGIAS: 0
NERVOUS/ANXIOUS: 0
PALPITATIONS: 0
HEMATURIA: 0
FREQUENCY: 0
DIARRHEA: 0
DIZZINESS: 0
HEMATOCHEZIA: 0
DYSURIA: 0
NERVOUS/ANXIOUS: 1
STRIDOR: 0
CONFUSION: 0
CONSTIPATION: 0
EYE PAIN: 0
HEADACHES: 0
COUGH: 0
HEARTBURN: 0
ABDOMINAL PAIN: 0

## 2023-08-25 ASSESSMENT — ANXIETY QUESTIONNAIRES
2. NOT BEING ABLE TO STOP OR CONTROL WORRYING: NOT AT ALL
7. FEELING AFRAID AS IF SOMETHING AWFUL MIGHT HAPPEN: NOT AT ALL
3. WORRYING TOO MUCH ABOUT DIFFERENT THINGS: NOT AT ALL
5. BEING SO RESTLESS THAT IT IS HARD TO SIT STILL: NOT AT ALL
6. BECOMING EASILY ANNOYED OR IRRITABLE: NOT AT ALL
1. FEELING NERVOUS, ANXIOUS, OR ON EDGE: NOT AT ALL
IF YOU CHECKED OFF ANY PROBLEMS ON THIS QUESTIONNAIRE, HOW DIFFICULT HAVE THESE PROBLEMS MADE IT FOR YOU TO DO YOUR WORK, TAKE CARE OF THINGS AT HOME, OR GET ALONG WITH OTHER PEOPLE: NOT DIFFICULT AT ALL
GAD7 TOTAL SCORE: 0
GAD7 TOTAL SCORE: 0
4. TROUBLE RELAXING: NOT AT ALL

## 2023-08-25 ASSESSMENT — PATIENT HEALTH QUESTIONNAIRE - PHQ9
SUM OF ALL RESPONSES TO PHQ QUESTIONS 1-9: 7
SUM OF ALL RESPONSES TO PHQ QUESTIONS 1-9: 7
10. IF YOU CHECKED OFF ANY PROBLEMS, HOW DIFFICULT HAVE THESE PROBLEMS MADE IT FOR YOU TO DO YOUR WORK, TAKE CARE OF THINGS AT HOME, OR GET ALONG WITH OTHER PEOPLE: SOMEWHAT DIFFICULT

## 2023-08-25 NOTE — PROGRESS NOTES
SUBJECTIVE:   CC: Shahzad is an 27 year old who presents for preventative health visit.       8/25/2023     4:22 PM   Additional Questions   Roomed by Arianna Larsen   Accompanied by none       Shahzad is a pleasant 27-year-old male who presents to clinic today for annual physical.  Past medical history significant for anxiety and depression.  He has been off of his medications for about a month as he is has been forgetting to take them.  He states that since he has been off of the Lexapro and the Wellbutrin his anxiety and depression has worsened.  He also uses hydroxyzine as needed.  He is otherwise feeling well today.  No questions or concerns regarding his chronic health.    Healthy Habits:     Getting at least 3 servings of Calcium per day:  Yes    Bi-annual eye exam:  Yes    Dental care twice a year:  Yes    Sleep apnea or symptoms of sleep apnea:  None    Diet:  Regular (no restrictions)    Frequency of exercise:  2-3 days/week    Duration of exercise:  30-45 minutes    Taking medications regularly:  No    Barriers to taking medications:  Problems remembering to take them    Medication side effects:  Muscle aches    Additional concerns today:  No      Today's PHQ-9 Score:       8/25/2023     7:11 AM   PHQ-9 SCORE   PHQ-9 Total Score MyChart 7 (Mild depression)   PHQ-9 Total Score 7           Social History     Tobacco Use    Smoking status: Never     Passive exposure: Never    Smokeless tobacco: Never   Substance Use Topics    Alcohol use: Not Currently             8/25/2023     7:13 AM   Alcohol Use   Prescreen: >3 drinks/day or >7 drinks/week? No       Last PSA: No results found for: PSA    Reviewed orders with patient. Reviewed health maintenance and updated orders accordingly - Yes  Lab work is in process    Reviewed and updated as needed this visit by clinical staff   Tobacco  Allergies  Meds              Reviewed and updated as needed this visit by Provider                 Past Medical History:   Diagnosis  "Date    Concussion with loss of consciousness of 30 minutes or less     Created by Conversion     Depression     Suicidal ideation     Suicide attempt (H)     suicidal ideation, no attempt      No past surgical history on file.    Review of Systems   Constitutional:  Negative for chills and fever.   HENT:  Negative for congestion, ear pain, hearing loss and sore throat.    Eyes:  Negative for pain and visual disturbance.   Respiratory:  Negative for cough, choking, shortness of breath, wheezing and stridor.    Cardiovascular:  Negative for chest pain, palpitations and peripheral edema.   Gastrointestinal:  Negative for abdominal pain, constipation, diarrhea, heartburn, hematochezia and nausea.   Endocrine: Negative.    Genitourinary:  Negative for dysuria, frequency, genital sores, hematuria, impotence, penile discharge and urgency.   Musculoskeletal:  Negative for arthralgias, joint swelling and myalgias.   Skin:  Negative for rash.   Neurological:  Negative for dizziness, weakness, headaches and paresthesias.   Psychiatric/Behavioral:  Positive for mood changes. Negative for behavioral problems, confusion and decreased concentration. The patient is nervous/anxious.        OBJECTIVE:   /72 (BP Location: Left arm, Patient Position: Sitting, Cuff Size: Adult Regular)   Pulse 97   Temp 98.8  F (37.1  C) (Oral)   Resp 12   Ht 1.715 m (5' 7.52\")   Wt 83.5 kg (184 lb)   SpO2 97%   BMI 28.38 kg/m      Physical Exam  Vitals and nursing note reviewed.   Constitutional:       General: He is not in acute distress.     Appearance: Normal appearance. He is well-developed and well-groomed. He is not ill-appearing or toxic-appearing.   HENT:      Head: Normocephalic and atraumatic.      Right Ear: Tympanic membrane, ear canal and external ear normal.      Left Ear: Tympanic membrane, ear canal and external ear normal.      Mouth/Throat:      Lips: Pink.      Mouth: Mucous membranes are moist.      Palate: No mass.    "   Pharynx: Oropharynx is clear. Uvula midline.      Tonsils: No tonsillar exudate or tonsillar abscesses.   Eyes:      General: Lids are normal.         Right eye: No discharge.         Left eye: No discharge.   Neck:      Trachea: Trachea normal.   Cardiovascular:      Rate and Rhythm: Normal rate and regular rhythm.      Heart sounds: S1 normal and S2 normal. No murmur heard.  Pulmonary:      Effort: Pulmonary effort is normal.      Breath sounds: Normal breath sounds and air entry.   Abdominal:      General: Bowel sounds are normal. There is no distension.      Palpations: Abdomen is soft.      Tenderness: There is no abdominal tenderness. There is no guarding or rebound.   Musculoskeletal:      Cervical back: Full passive range of motion without pain and neck supple.      Right lower leg: No edema.      Left lower leg: No edema.   Lymphadenopathy:      Cervical: No cervical adenopathy.   Skin:     General: Skin is warm and dry.      Findings: No lesion or rash.   Neurological:      General: No focal deficit present.      Mental Status: He is alert.      Cranial Nerves: No cranial nerve deficit.      Gait: Gait is intact.      Deep Tendon Reflexes:      Reflex Scores:       Patellar reflexes are 2+ on the right side and 2+ on the left side.  Psychiatric:         Attention and Perception: Attention normal.         Mood and Affect: Mood normal.         Speech: Speech normal.         Behavior: Behavior normal.         Thought Content: Thought content normal. Thought content does not include homicidal or suicidal ideation. Thought content does not include homicidal or suicidal plan.         ASSESSMENT/PLAN:     Annual physical exam  No concerns today.  He does not wish to update labs today.  Tetanus was updated today.    FLASH (generalized anxiety disorder)  Depression, major, recurrent, severe with psychosis (H)  He has been off of the Lexapro and Wellbutrin for about a month.  I did recommend restarting them as he  "feels that his anxiety and depression has somewhat worsened.  He denies any thoughts of harming himself or others.  No suicidal ideations.  Refilled medications today.  He is to follow-up in 4 to 6 weeks if he needs medication adjustment.  - buPROPion (WELLBUTRIN XL) 150 MG 24 hr tablet; Take 1 tablet (150 mg) by mouth every morning  - escitalopram (LEXAPRO) 20 MG tablet; Take 1 tablet (20 mg) by mouth daily    Elevated liver enzymes  Mild elevated liver enzymes in the past.  We did discuss watching alcohol and Tylenol intake.  He did not wish to recheck labs today.    Liver Function Studies -   Recent Labs   Lab Test 04/15/22  1642   PROTTOTAL 8.1*   ALBUMIN 4.7   BILITOTAL 0.9   ALKPHOS 71   AST 25   ALT 57*         Patient has been advised of split billing requirements and indicates understanding: Yes      COUNSELING:   Reviewed preventive health counseling, as reflected in patient instructions       Regular exercise       Healthy diet/nutrition       Vision screening      BMI:   Estimated body mass index is 28.38 kg/m  as calculated from the following:    Height as of this encounter: 1.715 m (5' 7.52\").    Weight as of this encounter: 83.5 kg (184 lb).   Weight management plan: Discussed healthy diet and exercise guidelines      He reports that he has never smoked. He has never been exposed to tobacco smoke. He has never used smokeless tobacco.            Conner Massey PA-C  [facility] submitted by the patient for this visit:  Patient Health Questionnaire (Submitted on 8/25/2023)  If you checked off any problems, how difficult have these problems made it for you to do your work, take care of things at home, or get along with other people?: Somewhat difficult  PHQ9 TOTAL SCORE: 7  FLASH-7 (Submitted on 8/25/2023)  FLASH 7 TOTAL SCORE: 0    "

## 2024-05-08 ENCOUNTER — OFFICE VISIT (OUTPATIENT)
Dept: FAMILY MEDICINE | Facility: CLINIC | Age: 29
End: 2024-05-08
Payer: COMMERCIAL

## 2024-05-08 VITALS
HEIGHT: 67 IN | OXYGEN SATURATION: 98 % | DIASTOLIC BLOOD PRESSURE: 80 MMHG | RESPIRATION RATE: 20 BRPM | WEIGHT: 182 LBS | HEART RATE: 86 BPM | BODY MASS INDEX: 28.56 KG/M2 | TEMPERATURE: 98.6 F | SYSTOLIC BLOOD PRESSURE: 124 MMHG

## 2024-05-08 DIAGNOSIS — F41.1 GAD (GENERALIZED ANXIETY DISORDER): ICD-10-CM

## 2024-05-08 DIAGNOSIS — F33.3 DEPRESSION, MAJOR, RECURRENT, SEVERE WITH PSYCHOSIS (H): ICD-10-CM

## 2024-05-08 DIAGNOSIS — Z83.79 FAMILY HISTORY OF CELIAC DISEASE: ICD-10-CM

## 2024-05-08 DIAGNOSIS — Z00.00 ANNUAL PHYSICAL EXAM: Primary | ICD-10-CM

## 2024-05-08 PROCEDURE — 99395 PREV VISIT EST AGE 18-39: CPT | Performed by: PHYSICIAN ASSISTANT

## 2024-05-08 PROCEDURE — 99213 OFFICE O/P EST LOW 20 MIN: CPT | Mod: 25 | Performed by: PHYSICIAN ASSISTANT

## 2024-05-08 SDOH — HEALTH STABILITY: PHYSICAL HEALTH: ON AVERAGE, HOW MANY DAYS PER WEEK DO YOU ENGAGE IN MODERATE TO STRENUOUS EXERCISE (LIKE A BRISK WALK)?: 0 DAYS

## 2024-05-08 ASSESSMENT — ANXIETY QUESTIONNAIRES
4. TROUBLE RELAXING: NOT AT ALL
3. WORRYING TOO MUCH ABOUT DIFFERENT THINGS: NOT AT ALL
6. BECOMING EASILY ANNOYED OR IRRITABLE: NOT AT ALL
7. FEELING AFRAID AS IF SOMETHING AWFUL MIGHT HAPPEN: NOT AT ALL
GAD7 TOTAL SCORE: 0
GAD7 TOTAL SCORE: 0
IF YOU CHECKED OFF ANY PROBLEMS ON THIS QUESTIONNAIRE, HOW DIFFICULT HAVE THESE PROBLEMS MADE IT FOR YOU TO DO YOUR WORK, TAKE CARE OF THINGS AT HOME, OR GET ALONG WITH OTHER PEOPLE: NOT DIFFICULT AT ALL
GAD7 TOTAL SCORE: 0
5. BEING SO RESTLESS THAT IT IS HARD TO SIT STILL: NOT AT ALL
7. FEELING AFRAID AS IF SOMETHING AWFUL MIGHT HAPPEN: NOT AT ALL
2. NOT BEING ABLE TO STOP OR CONTROL WORRYING: NOT AT ALL
8. IF YOU CHECKED OFF ANY PROBLEMS, HOW DIFFICULT HAVE THESE MADE IT FOR YOU TO DO YOUR WORK, TAKE CARE OF THINGS AT HOME, OR GET ALONG WITH OTHER PEOPLE?: NOT DIFFICULT AT ALL
1. FEELING NERVOUS, ANXIOUS, OR ON EDGE: NOT AT ALL

## 2024-05-08 ASSESSMENT — SOCIAL DETERMINANTS OF HEALTH (SDOH): HOW OFTEN DO YOU GET TOGETHER WITH FRIENDS OR RELATIVES?: PATIENT DECLINED

## 2024-05-08 ASSESSMENT — PATIENT HEALTH QUESTIONNAIRE - PHQ9
SUM OF ALL RESPONSES TO PHQ QUESTIONS 1-9: 5
10. IF YOU CHECKED OFF ANY PROBLEMS, HOW DIFFICULT HAVE THESE PROBLEMS MADE IT FOR YOU TO DO YOUR WORK, TAKE CARE OF THINGS AT HOME, OR GET ALONG WITH OTHER PEOPLE: NOT DIFFICULT AT ALL
SUM OF ALL RESPONSES TO PHQ QUESTIONS 1-9: 5

## 2024-05-08 NOTE — PROGRESS NOTES
"Preventive Care Visit  [facility]  Conner Massey PA-C, Family Medicine  May 8, 2024      Assessment & Plan     Annual physical exam  Overall doing well.  Does not wish to update annual labs.  Screening labs were stable     FLASH (generalized anxiety disorder)  Depression, major, recurrent, severe with psychosis (H)  Recently weaned off of all of his medications about 6 months ago as they were not helpful.  He continues to have bouts of anxiety and depression but he feels that they are doing well at this time.  He did bring FMLA paperwork to be filled out.  He would like to take 3 days off a month as needed.  I think this is reasonable.  We did discuss that if his mental health worsens he is to follow-up so we can try other medications.      Family history of celiac disease  Will check screening celiac panel.  - Tissue transglutaminase zuri IgA and IgG; Future  - Tissue transglutaminase zuri IgA and IgG; Future    Patient has been advised of split billing requirements and indicates understanding: Yes        BMI  Estimated body mass index is 28.51 kg/m  as calculated from the following:    Height as of this encounter: 1.702 m (5' 7\").    Weight as of this encounter: 82.6 kg (182 lb).   Weight management plan: Discussed healthy diet and exercise guidelines    Counseling  Appropriate preventive services were discussed with this patient, including applicable screening as appropriate for fall prevention, nutrition, physical activity, Tobacco-use cessation, weight loss and cognition.  Checklist reviewing preventive services available has been given to the patient.  Reviewed patient's diet, addressing concerns and/or questions.   The patient's PHQ-9 score is consistent with mild depression. He was provided with information regarding depression.         Seda Pike is a 28 year old, presenting for the following:  Physical (Non fasting Physical. Would like to discuss anxiety and depression as " well w/ DIANE paperwork)        5/8/2024     4:59 PM   Additional Questions   Roomed by Arianna Larsen   Accompanied by none        Health Care Directive  Patient does not have a Health Care Directive or Living Will: Discussed advance care planning with patient; however, patient declined at this time.    Shahzad is a pleasant 28-year-old male who presents to the clinic today for annual physical.  Overall he is feeling well today.  He does have underlying anxiety and depression.  He has weaned off of his antidepressant medications as he felt that they were not helpful.  He still has bouts of anxiety and depression but he feels that things are stable and well-controlled off of medications.  He otherwise is feeling well and has no other questions or concerns regarding his chronic health today    He is wondering about celiac's disease.  His sister was diagnosed recently with celiac's disease but he is not having any symptoms.  He would like to check labs.            5/8/2024   General Health   How would you rate your overall physical health? Good   Feel stress (tense, anxious, or unable to sleep) Not at all         5/8/2024   Nutrition   Three or more servings of calcium each day? Yes   Diet: Regular (no restrictions)   How many servings of fruit and vegetables per day? (!) 0-1   How many sweetened beverages each day? 0-1         5/8/2024   Exercise   Days per week of moderate/strenous exercise 0 days   (!) EXERCISE CONCERN      5/8/2024   Social Factors   Frequency of gathering with friends or relatives Patient declined   Worry food won't last until get money to buy more No   Food not last or not have enough money for food? No   Do you have housing?  Yes   Are you worried about losing your housing? No   Lack of transportation? No   Unable to get utilities (heat,electricity)? No         5/8/2024   Dental   Dentist two times every year? (!) DECLINE         5/8/2024   TB Screening   Were you born outside of the [place]? No        Today's PHQ-9 Score:       5/8/2024     4:59 PM   PHQ-9 SCORE   PHQ-9 Total Score MyChart 5 (Mild depression)   PHQ-9 Total Score 5         5/8/2024   Substance Use   Alcohol more than 3/day or more than 7/wk No   Do you use any other substances recreationally? No     Social History     Tobacco Use    Smoking status: Never     Passive exposure: Never    Smokeless tobacco: Never   Vaping Use    Vaping status: Never Used   Substance Use Topics    Alcohol use: Not Currently    Drug use: Never             5/8/2024   One time HIV Screening   Previous HIV test? No         5/8/2024   STI Screening   New sexual partner(s) since last STI/HIV test? No         5/8/2024   Contraception/Family Planning   Questions about contraception or family planning No        Reviewed and updated as needed this visit by Provider                    Past Medical History:   Diagnosis Date    Concussion with loss of consciousness of 30 minutes or less     Created by Conversion     Depression     Suicidal ideation     Suicide attempt (H)     suicidal ideation, no attempt     No past surgical history on file.    Review of Systems   Constitutional:  Negative for chills and fever.   HENT:  Negative for ear pain and sore throat.    Eyes:  Negative for pain and visual disturbance.   Respiratory:  Negative for cough and shortness of breath.    Cardiovascular:  Negative for chest pain and palpitations.   Gastrointestinal:  Negative for abdominal pain and vomiting.   Genitourinary:  Negative for dysuria and hematuria.   Musculoskeletal:  Negative for arthralgias and back pain.   Skin:  Negative for color change and rash.   Neurological:  Negative for seizures and syncope.   Psychiatric/Behavioral:  Positive for decreased concentration. Negative for dysphoric mood, hallucinations, self-injury, sleep disturbance and suicidal ideas. The patient is nervous/anxious. The patient is not hyperactive.    All other systems reviewed and are negative.        "  Objective    Exam  /80 (BP Location: Right arm, Patient Position: Sitting, Cuff Size: Adult Regular)   Pulse 86   Temp 98.6  F (37  C) (Oral)   Resp 20   Ht 1.702 m (5' 7\")   Wt 82.6 kg (182 lb)   SpO2 98%   BMI 28.51 kg/m     Estimated body mass index is 28.51 kg/m  as calculated from the following:    Height as of this encounter: 1.702 m (5' 7\").    Weight as of this encounter: 82.6 kg (182 lb).    Physical Exam  Vitals and nursing note reviewed.   Constitutional:       General: He is not in acute distress.     Appearance: Normal appearance. He is well-developed and well-groomed. He is not ill-appearing or toxic-appearing.   HENT:      Head: Normocephalic and atraumatic.      Right Ear: Tympanic membrane, ear canal and external ear normal.      Left Ear: Tympanic membrane, ear canal and external ear normal.      Mouth/Throat:      Lips: Pink.      Mouth: Mucous membranes are moist.      Palate: No mass.      Pharynx: Oropharynx is clear. Uvula midline.      Tonsils: No tonsillar exudate or tonsillar abscesses.   Eyes:      General: Lids are normal.         Right eye: No discharge.         Left eye: No discharge.   Neck:      Trachea: Trachea normal.   Cardiovascular:      Rate and Rhythm: Normal rate and regular rhythm.      Heart sounds: S1 normal and S2 normal. No murmur heard.  Pulmonary:      Effort: Pulmonary effort is normal.      Breath sounds: Normal breath sounds and air entry.   Abdominal:      General: Bowel sounds are normal. There is no distension.      Palpations: Abdomen is soft.      Tenderness: There is no abdominal tenderness. There is no guarding or rebound.   Musculoskeletal:      Cervical back: Full passive range of motion without pain and neck supple.      Right lower leg: No edema.      Left lower leg: No edema.   Lymphadenopathy:      Cervical: No cervical adenopathy.   Skin:     General: Skin is warm and dry.      Findings: No lesion or rash.   Neurological:      General: No " focal deficit present.      Mental Status: He is alert.      Cranial Nerves: No cranial nerve deficit.      Gait: Gait is intact.      Deep Tendon Reflexes:      Reflex Scores:       Patellar reflexes are 2+ on the right side and 2+ on the left side.  Psychiatric:         Attention and Perception: Attention normal.         Mood and Affect: Mood normal.         Speech: Speech normal.         Signed Electronically by: Conenr Massey PA-C    Answers submitted by the patient for this visit:  Patient Health Questionnaire (Submitted on 5/8/2024)  If you checked off any problems, how difficult have these problems made it for you to do your work, take care of things at home, or get along with other people?: Not difficult at all  PHQ9 TOTAL SCORE: 5  FLASH-7 (Submitted on 5/8/2024)  FLASH 7 TOTAL SCORE: 0

## 2024-05-13 ENCOUNTER — TELEPHONE (OUTPATIENT)
Dept: FAMILY MEDICINE | Facility: CLINIC | Age: 29
End: 2024-05-13
Payer: COMMERCIAL

## 2024-05-13 ASSESSMENT — ENCOUNTER SYMPTOMS
ARTHRALGIAS: 0
DECREASED CONCENTRATION: 1
SORE THROAT: 0
COLOR CHANGE: 0
HEMATURIA: 0
COUGH: 0
FEVER: 0
BACK PAIN: 0
SHORTNESS OF BREATH: 0
HALLUCINATIONS: 0
ABDOMINAL PAIN: 0
DYSPHORIC MOOD: 0
DYSURIA: 0
PALPITATIONS: 0
SEIZURES: 0
EYE PAIN: 0
SLEEP DISTURBANCE: 0
VOMITING: 0
CHILLS: 0
HYPERACTIVE: 0
NERVOUS/ANXIOUS: 1

## 2024-05-13 NOTE — TELEPHONE ENCOUNTER
Form prepped and up for ML to review, complete and sign.    Arianna Larsen MA on 5/13/2024 at 10:11 AM

## 2024-05-17 ENCOUNTER — LAB (OUTPATIENT)
Dept: LAB | Facility: CLINIC | Age: 29
End: 2024-05-17
Payer: COMMERCIAL

## 2024-05-17 DIAGNOSIS — Z83.79 FAMILY HISTORY OF CELIAC DISEASE: ICD-10-CM

## 2024-05-17 PROCEDURE — 86364 TISS TRNSGLTMNASE EA IG CLAS: CPT

## 2024-05-17 PROCEDURE — 36415 COLL VENOUS BLD VENIPUNCTURE: CPT

## 2024-05-20 LAB
TTG IGA SER-ACNC: 1.4 U/ML
TTG IGG SER-ACNC: <0.6 U/ML

## 2024-07-30 ENCOUNTER — TRANSFERRED RECORDS (OUTPATIENT)
Dept: HEALTH INFORMATION MANAGEMENT | Facility: CLINIC | Age: 29
End: 2024-07-30
Payer: COMMERCIAL

## 2024-08-20 ENCOUNTER — PATIENT OUTREACH (OUTPATIENT)
Dept: CARE COORDINATION | Facility: CLINIC | Age: 29
End: 2024-08-20
Payer: COMMERCIAL

## 2024-08-23 ENCOUNTER — OFFICE VISIT (OUTPATIENT)
Dept: FAMILY MEDICINE | Facility: CLINIC | Age: 29
End: 2024-08-23
Payer: COMMERCIAL

## 2024-08-23 VITALS
WEIGHT: 182 LBS | OXYGEN SATURATION: 99 % | BODY MASS INDEX: 28.56 KG/M2 | HEART RATE: 89 BPM | HEIGHT: 67 IN | TEMPERATURE: 98.3 F | DIASTOLIC BLOOD PRESSURE: 84 MMHG | SYSTOLIC BLOOD PRESSURE: 120 MMHG | RESPIRATION RATE: 16 BRPM

## 2024-08-23 DIAGNOSIS — Z11.4 SCREENING FOR HIV (HUMAN IMMUNODEFICIENCY VIRUS): Primary | ICD-10-CM

## 2024-08-23 DIAGNOSIS — Z11.59 NEED FOR HEPATITIS C SCREENING TEST: ICD-10-CM

## 2024-08-23 DIAGNOSIS — K21.00 GASTROESOPHAGEAL REFLUX DISEASE WITH ESOPHAGITIS, UNSPECIFIED WHETHER HEMORRHAGE: ICD-10-CM

## 2024-08-23 PROCEDURE — 99213 OFFICE O/P EST LOW 20 MIN: CPT | Performed by: PHYSICIAN ASSISTANT

## 2024-08-23 ASSESSMENT — ENCOUNTER SYMPTOMS: SORE THROAT: 1

## 2024-08-23 NOTE — PROGRESS NOTES
Assessment & Plan     Gastroesophageal reflux disease with esophagitis, unspecified whether hemorrhage  Chest has been having sore throat and cough worse in the morning.  He also has a global sensation.  He denies any difficulty with swallowing liquids or solids.  No pain with swallowing.  He states his cough is throughout the day.  He has been having this for few months now.  He denies any fevers or chills.  Suspect that his symptoms are due to reflux.  Will start on on omeprazole 20 mg daily.  He does have a follow-up with ENT in 9/16/2024 recommend following up with them if symptoms are not better or if symptoms worsen for further evaluation.  Symptoms for prompt reevaluation discussed in detail.  - omeprazole (PRILOSEC) 20 MG DR capsule; Take 1 capsule (20 mg) by mouth daily.        Subjective   Shahzad is a 28 year old, presenting for the following health issues:  Pharyngitis (Pt reports he has had a sore throat, trouble swallowing and L sided neck pain. Throat seems very dry, mainly in the morning. No other sx. Pt reports he has had these sx for 1 month)      8/23/2024     4:04 PM   Additional Questions   Roomed by Arianna Larsen   Accompanied by jennifer     Shahzad is a pleasant 28-year-old male who presents to the clinic today for follow-up on cough and sore throat.  He states over the last 3 to 6 months he has been dealing with difficulty with left-sided throat pain and coughing especially worse in the morning.  He also has a global sensation in the morning which gets better throughout the day.  He is not having any fevers or chills.  He was recently seen in urgent care and had a referral placed to ENT.  ENT appointment scheduled for 9/16/2023.  He denies any abdominal pain or discomfort or radiating pain in the chest.  He denies any enlarged lymph nodes or masses within the neck.    History of Present Illness       Reason for visit:  Been having issues with swelling in my throat and neck pain that have been present  "for over a month  Symptom onset:  More than a month  Symptom intensity:  Moderate  Symptom progression:  Staying the same  Had these symptoms before:  No   He is taking medications regularly.       Review of Systems   Constitutional:  Negative for chills, fatigue and fever.   HENT:  Positive for sore throat. Negative for congestion, ear discharge, ear pain, hearing loss, mouth sores, nosebleeds, postnasal drip and rhinorrhea.    Respiratory:  Positive for cough. Negative for apnea, chest tightness, shortness of breath, wheezing and stridor.    Cardiovascular:  Negative for chest pain and palpitations.   Gastrointestinal: Negative.    Skin:  Negative for rash.   Neurological:  Negative for dizziness, light-headedness and headaches.           Objective    /84 (BP Location: Left arm, Patient Position: Sitting, Cuff Size: Adult Regular)   Pulse 89   Temp 98.3  F (36.8  C) (Oral)   Resp 16   Ht 1.702 m (5' 7\")   Wt 82.6 kg (182 lb)   SpO2 99%   BMI 28.51 kg/m    Body mass index is 28.51 kg/m .  Physical Exam  Vitals and nursing note reviewed.   Constitutional:       General: He is not in acute distress.     Appearance: Normal appearance. He is not ill-appearing, toxic-appearing or diaphoretic.   HENT:      Head: Normocephalic and atraumatic.      Right Ear: Tympanic membrane, ear canal and external ear normal.      Left Ear: Tympanic membrane, ear canal and external ear normal.      Nose: No congestion or rhinorrhea.      Mouth/Throat:      Lips: Pink.      Mouth: Mucous membranes are moist.      Tongue: No lesions.      Pharynx: No pharyngeal swelling, oropharyngeal exudate, posterior oropharyngeal erythema or uvula swelling.      Tonsils: No tonsillar exudate or tonsillar abscesses. 1+ on the right. 1+ on the left.   Neck:      Thyroid: No thyroid mass, thyromegaly or thyroid tenderness.   Cardiovascular:      Rate and Rhythm: Normal rate and regular rhythm.      Heart sounds: No murmur heard.     No " friction rub. No gallop.   Pulmonary:      Effort: Pulmonary effort is normal.      Breath sounds: No wheezing, rhonchi or rales.   Abdominal:      General: Abdomen is flat.      Tenderness: There is no abdominal tenderness. There is no guarding or rebound.   Lymphadenopathy:      Cervical: No cervical adenopathy.   Neurological:      Mental Status: He is alert.   Psychiatric:         Mood and Affect: Mood normal.         Behavior: Behavior normal.         Thought Content: Thought content normal.         Judgment: Judgment normal.                  Signed Electronically by: Conner Massey PA-C

## 2024-08-26 ASSESSMENT — ENCOUNTER SYMPTOMS
HEADACHES: 0
GASTROINTESTINAL NEGATIVE: 1
FEVER: 0
DIZZINESS: 0
WHEEZING: 0
PALPITATIONS: 0
LIGHT-HEADEDNESS: 0
COUGH: 1
SHORTNESS OF BREATH: 0
APNEA: 0
CHILLS: 0
CHEST TIGHTNESS: 0
RHINORRHEA: 0
STRIDOR: 0
FATIGUE: 0

## 2024-09-03 ENCOUNTER — PATIENT OUTREACH (OUTPATIENT)
Dept: CARE COORDINATION | Facility: CLINIC | Age: 29
End: 2024-09-03
Payer: COMMERCIAL

## 2024-11-16 ENCOUNTER — HEALTH MAINTENANCE LETTER (OUTPATIENT)
Age: 29
End: 2024-11-16

## 2025-03-13 ENCOUNTER — APPOINTMENT (OUTPATIENT)
Dept: ULTRASOUND IMAGING | Facility: CLINIC | Age: 30
End: 2025-03-13
Attending: EMERGENCY MEDICINE
Payer: COMMERCIAL

## 2025-03-13 ENCOUNTER — OFFICE VISIT (OUTPATIENT)
Dept: URGENT CARE | Facility: URGENT CARE | Age: 30
End: 2025-03-13
Payer: COMMERCIAL

## 2025-03-13 ENCOUNTER — HOSPITAL ENCOUNTER (EMERGENCY)
Facility: CLINIC | Age: 30
Discharge: HOME | End: 2025-03-13
Attending: EMERGENCY MEDICINE
Payer: COMMERCIAL

## 2025-03-13 VITALS
RESPIRATION RATE: 16 BRPM | TEMPERATURE: 98.6 F | SYSTOLIC BLOOD PRESSURE: 119 MMHG | HEART RATE: 89 BPM | WEIGHT: 183 LBS | OXYGEN SATURATION: 98 % | DIASTOLIC BLOOD PRESSURE: 71 MMHG | BODY MASS INDEX: 28.66 KG/M2

## 2025-03-13 VITALS
TEMPERATURE: 98.5 F | HEART RATE: 115 BPM | DIASTOLIC BLOOD PRESSURE: 73 MMHG | SYSTOLIC BLOOD PRESSURE: 142 MMHG | HEIGHT: 68 IN | RESPIRATION RATE: 16 BRPM | BODY MASS INDEX: 27.74 KG/M2 | OXYGEN SATURATION: 99 % | WEIGHT: 183 LBS

## 2025-03-13 DIAGNOSIS — N50.3 EPIDIDYMAL CYST: ICD-10-CM

## 2025-03-13 DIAGNOSIS — N50.811 TESTICULAR PAIN, RIGHT: ICD-10-CM

## 2025-03-13 DIAGNOSIS — N50.811 PAIN IN RIGHT TESTICLE: Primary | ICD-10-CM

## 2025-03-13 LAB
ALBUMIN UR-MCNC: 10 MG/DL
APPEARANCE UR: CLEAR
BILIRUB UR QL STRIP: NEGATIVE
COLOR UR AUTO: YELLOW
GLUCOSE UR STRIP-MCNC: NEGATIVE MG/DL
HGB UR QL STRIP: NEGATIVE
KETONES UR STRIP-MCNC: 10 MG/DL
LEUKOCYTE ESTERASE UR QL STRIP: NEGATIVE
MUCOUS THREADS #/AREA URNS LPF: PRESENT /LPF
NITRATE UR QL: NEGATIVE
PH UR STRIP: 6 [PH] (ref 5–7)
RBC URINE: 1 /HPF
SP GR UR STRIP: 1.02 (ref 1–1.03)
SQUAMOUS EPITHELIAL: <1 /HPF
UROBILINOGEN UR STRIP-MCNC: <2 MG/DL
WBC URINE: 1 /HPF

## 2025-03-13 PROCEDURE — 81003 URINALYSIS AUTO W/O SCOPE: CPT | Performed by: EMERGENCY MEDICINE

## 2025-03-13 PROCEDURE — 99284 EMERGENCY DEPT VISIT MOD MDM: CPT | Mod: 25 | Performed by: EMERGENCY MEDICINE

## 2025-03-13 PROCEDURE — 250N000013 HC RX MED GY IP 250 OP 250 PS 637: Performed by: EMERGENCY MEDICINE

## 2025-03-13 PROCEDURE — 93976 VASCULAR STUDY: CPT

## 2025-03-13 RX ORDER — ACETAMINOPHEN 325 MG/1
975 TABLET ORAL ONCE
Status: COMPLETED | OUTPATIENT
Start: 2025-03-13 | End: 2025-03-13

## 2025-03-13 RX ORDER — OXYCODONE HYDROCHLORIDE 5 MG/1
5 TABLET ORAL ONCE
Status: COMPLETED | OUTPATIENT
Start: 2025-03-13 | End: 2025-03-13

## 2025-03-13 RX ORDER — IBUPROFEN 800 MG/1
800 TABLET, FILM COATED ORAL ONCE
Status: COMPLETED | OUTPATIENT
Start: 2025-03-13 | End: 2025-03-13

## 2025-03-13 RX ADMIN — ACETAMINOPHEN 975 MG: 325 TABLET ORAL at 20:19

## 2025-03-13 RX ADMIN — IBUPROFEN 800 MG: 800 TABLET, FILM COATED ORAL at 20:19

## 2025-03-13 RX ADMIN — OXYCODONE 5 MG: 5 TABLET ORAL at 21:04

## 2025-03-13 ASSESSMENT — COLUMBIA-SUICIDE SEVERITY RATING SCALE - C-SSRS
1. IN THE PAST MONTH, HAVE YOU WISHED YOU WERE DEAD OR WISHED YOU COULD GO TO SLEEP AND NOT WAKE UP?: NO
2. HAVE YOU ACTUALLY HAD ANY THOUGHTS OF KILLING YOURSELF IN THE PAST MONTH?: NO
6. HAVE YOU EVER DONE ANYTHING, STARTED TO DO ANYTHING, OR PREPARED TO DO ANYTHING TO END YOUR LIFE?: NO

## 2025-03-13 NOTE — ED NOTES
Expected Patient Referral to ED  5:32 PM    Referring Clinic/Provider:  Walk-in clinic    Reason for referral/Clinical facts:  - right testicle pain since 3pm  - exam with elevated right testicle with tenderness, no edema or erythema  - needs US for torsion    Recommendations provided:  Send to ED for further evaluation    Caller was informed that this institution does possess the capabilities and/or resources to provide for patient and should be transferred to our facility.    Discussed that if direct admit is sought and any hurdles are encountered, this ED would be happy to see the patient and evaluate.    Informed caller that recommendations provided are recommendations based only on the facts provided and that they responsible to accept or reject the advice, or to seek a formal in person consultation as needed and that this ED will see/treat patient should they arrive.      Jason Parada MD  [facility] EMERGENCY ROOM  [address] [place] [id number]  [phone]     Jason Parada MD  03/13/25 [id number]

## 2025-03-13 NOTE — PROGRESS NOTES
Assessment/Plan:   1. Pain in right testicle (Primary)  - US Testicular & Scrotum w Doppler Ltd; Future    Right testicle pain 3pm today. Getting worse.   Rule out torsion  Attempted to get emergent ultrasound outpatient but they were unable to do an urgent request at this time therefore will send to ER to get more emergent care.   To ER now    Discussed with ER MD     Patient will travel with family member by private vehicle.     I discussed red flag symptoms, return precautions to clinic/ER and follow up care with patient/parent.  Expected clinical course, symptomatic cares advised. Questions answered. Patient/parent amenable with plan.      Subjective:     Jamir Vallejo is a 29 year old male who presents for right testicle pain.   Onset today while at work, sitting at desk. Sudden onset, pain radiates to lower abdomen, getting worse.   No blood in urine, no pain with urination, no penile discharge. No trauma.   No fever.   Ice pack helped on way to the clinic.     Allergies   Allergen Reactions    Amoxicillin Rash     Current Outpatient Medications   Medication Sig Dispense Refill    omeprazole (PRILOSEC) 20 MG DR capsule Take 1 capsule (20 mg) by mouth daily. (Patient not taking: Reported on 3/13/2025) 90 capsule 3     No current facility-administered medications for this visit.     Patient Active Problem List   Diagnosis    FLASH (generalized anxiety disorder)    Depression, major, recurrent, severe with psychosis (H)    Mood disorder    Annual physical exam    Atypical chest pain       Objective:     /71   Pulse 89   Temp 98.6  F (37  C)   Resp 16   Wt 83 kg (183 lb)   SpO2 98%   BMI 28.66 kg/m      Physical      General Appearance: Alert, pleasant, no distress but uncomfortable. AVSS  Head: Normocephalic, without obvious abnormality, atraumatic  Eyes: Conjunctivae are normal.   Lungs:  respirations unlabored  Abdomen: Soft, non-distended, tender suprapubic area but not with palpation, no guarding.  No mass Right testicle is elevated and slightly more horizontal. Tender testicle and above the testicle, no obvious hernia. No penile lesions or drainage.   Psychiatric: Patient has a normal mood and affect.         This note has been dictated in part using voice recognition software.  Any grammatical or context distortions are unintentional and inherent to the software.  Please feel free to contact me directly for clarification if needed.

## 2025-03-14 ENCOUNTER — PATIENT OUTREACH (OUTPATIENT)
Dept: NURSING | Facility: CLINIC | Age: 30
End: 2025-03-14
Payer: COMMERCIAL

## 2025-03-14 NOTE — DISCHARGE INSTRUCTIONS
As needed for pain control at home, take:  - over-the-counter ibuprofen 600mg by mouth every 6 hours (max dose 2400mg in 24 hours)  - over-the-counter acetaminophen 1g by mouth every 6 hours (max dose 4g in 24 hours)  - ice pack to painful area up to 20 minutes every 1 hour    Follow up with your Primary Care provider in 2 days for a recheck.    Return to the Emergency Department for any difficulty breathing, persistent vomiting, severe worsening, or any other concerns.

## 2025-03-14 NOTE — TELEPHONE ENCOUNTER
Left message for patient to return call for ED follow up.     If patient calls back, please route to RN to complete TCM.     Pura Correa RN  [facility]    Notes for RN in case of return call from patient or second attempt call:  Discharged on 3/13/25 from [facility].  ER or Hospital? ER  Diagnosis for visit: Testicular pain, right and epididymal cyst  Other information if necessary: Recommended ibuprofen, tylenol, ice for pain management.   Advised to follow up with PCP within 2 days    Delete this note after TCM call documentation is completed.

## 2025-03-14 NOTE — ED PROVIDER NOTES
EMERGENCY DEPARTMENT ENCOUNTER      NAME: Jamir Vallejo  AGE: 29 year old male  YOB: 1995  MRN: [id number]  EVALUATION DATE & TIME: No admission date for patient encounter.    PCP: Conner Massey    ED PROVIDER: Jason Parada M.D.      Chief Complaint   Patient presents with    Groin Swelling         IMPRESSION  1. Testicular pain, right    2. Epididymal cyst        PLAN  - NSAIDs for pain  - close PCP follow up  - discharge to home    ED COURSE & MEDICAL DECISION MAKING    ED Course as of 03/13/25 2153   Thu Mar 13, 2025   1758 Patient seen in the waiting room due to boarding crisis.   2106 29yoM with no significant past medical history presenting with family from home for evaluation of right testicle pain. 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 Went to walk-in clinic and sent to the ED for torsion rule out. Has mild focal tenderness to right testicle on exam with no edema or overlying skin changes, unremarkable circumcised penis, no inguinal hernia, cremasteric reflexes intact bilaterally, no abdominal tenderness.    US obtained; no torsion. Has small epididymal cysts vs spermatoceles----suspect these causing pain. No other explanatory pathology. UA with no UTI. Patient with no concern for STD. Doubt intraabdominal process warrants blood tests or imaging; patient comfortable not obtaining this here now.    Pain improved with NSAIDs; will continue at home. Patient able to tolerate PO and walk without difficulty. Patient comfortable with discharge at this time. Return precautions and need for PCP follow up discussed and understood. No further questions at the time of discharge.       --------------------------------------------------------------------------------   --------------------------------------------------------------------------------     9:04 PM I met with the patient for the initial interview and physical examination. Discussed plan  for treatment and workup in the ED.        This patient involved a high degree of complexity in medical decision making, as significant risks were present and assessed. Recent encounters & results in medical record reviewed by me.    All workup (i.e. any EKG/labs/imaging as per charting below) reviewed and independently interpreted by me. See respective sections for details.    Broad differential considered for this patient, including but not limited to:  testicular torsion, epididymitis, varicocele, hydrocele, orchitis, UTI, STD, epididymal cyst, inguinal hernia, acute aortic syndrome, intraabdominal abscess, SBO, ureteral stone, sepsis, appendicitis      See additional MDM below if interested.      MEDICATIONS GIVEN IN THE EMERGENCY DEPARTMENT  Medications   ibuprofen (ADVIL/MOTRIN) tablet 800 mg (800 mg Oral $Given 3/13/25 2019)   acetaminophen (TYLENOL) tablet 975 mg (975 mg Oral $Given 3/13/25 2019)   oxyCODONE (ROXICODONE) tablet 5 mg (5 mg Oral $Given 3/13/25 2104)       NEW PRESCRIPTIONS STARTED AT TODAY'S ER VISIT  Discharge Medication List as of 3/13/2025  9:05 PM        CONTINUE these medications which have NOT CHANGED    Details   omeprazole (PRILOSEC) 20 MG DR capsule Take 1 capsule (20 mg) by mouth daily., Disp-90 capsule, R-3, E-Prescribe                 =================================================================      HPI  Use of : N/A         Jamirremedios Vallejo is a 29 year old male with no pertinent history who presents to this ED via walk-in for evaluation of right testicle swelling. He says that the pain started at 1500 today when he was at work. He says that the right testicle swelling has slowly been moving to the left testicle as well. Icing has helped his pain. He has never had pain like this before. Denies drainage from the penis, STD concerns, and any other complaints at this time.     Per chart review:  3/13/2025: Patient was seen at [facility] for right testicle swelling.  They attempted to get an emergent US outpatient but they were unable to do an urgent request at that time. Patient was sent here to the ER to get US and more emergent care.       --------------- MEDICAL HISTORY ---------------  PAST MEDICAL HISTORY:  Reviewed independently by me.  Past Medical History:   Diagnosis Date    Concussion with loss of consciousness of 30 minutes or less     Created by Conversion     Depression     Suicidal ideation     Suicide attempt (H)     suicidal ideation, no attempt     Patient Active Problem List   Diagnosis    FLASH (generalized anxiety disorder)    Depression, major, recurrent, severe with psychosis (H)    Mood disorder    Annual physical exam    Atypical chest pain       PAST SURGICAL HISTORY:  Reviewed independently by me.  History reviewed. No pertinent surgical history.    CURRENT MEDICATIONS:    Reviewed independently by me.  No current facility-administered medications for this encounter.    Current Outpatient Medications:     omeprazole (PRILOSEC) 20 MG DR capsule, Take 1 capsule (20 mg) by mouth daily. (Patient not taking: Reported on 3/13/2025), Disp: 90 capsule, Rfl: 3    ALLERGIES:  Reviewed independently by me.  Allergies   Allergen Reactions    Amoxicillin Rash       FAMILY HISTORY:  Reviewed independently by me.  Family History   Problem Relation Age of Onset    No Known Problems Mother     No Known Problems Father     Anxiety Disorder Sister        SOCIAL HISTORY:   Reviewed independently by me.  Social History     Socioeconomic History    Marital status: Single   Tobacco Use    Smoking status: Never     Passive exposure: Never    Smokeless tobacco: Never   Vaping Use    Vaping status: Never Used   Substance and Sexual Activity    Alcohol use: Not Currently    Drug use: Never    Sexual activity: Not Currently     Partners: Female, Male     Birth control/protection: Condom   Social History Narrative    Lives with parents      Social Drivers of Health     Financial  Resource Strain: Low Risk  (5/8/2024)    Financial Resource Strain     Within the past 12 months, have you or your family members you live with been unable to get utilities (heat, electricity) when it was really needed?: No   Food Insecurity: Low Risk  (5/8/2024)    Food Insecurity     Within the past 12 months, did you worry that your food would run out before you got money to buy more?: No     Within the past 12 months, did the food you bought just not last and you didn t have money to get more?: No   Transportation Needs: Low Risk  (5/8/2024)    Transportation Needs     Within the past 12 months, has lack of transportation kept you from medical appointments, getting your medicines, non-medical meetings or appointments, work, or from getting things that you need?: No   Physical Activity: Unknown (5/8/2024)    Exercise Vital Sign     Days of Exercise per Week: 0 days   Stress: No Stress Concern Present (5/8/2024)    [employer] - Occupational Stress Questionnaire     Feeling of Stress : Not at all   Social Connections: Unknown (5/8/2024)    Social Connection and Isolation Panel [NHANES]     Frequency of Social Gatherings with Friends and Family: Patient declined   Interpersonal Safety: Low Risk  (5/8/2024)    Interpersonal Safety     Do you feel physically and emotionally safe where you currently live?: Yes     Within the past 12 months, have you been hit, slapped, kicked or otherwise physically hurt by someone?: No     Within the past 12 months, have you been humiliated or emotionally abused in other ways by your partner or ex-partner?: No   Housing Stability: Low Risk  (5/8/2024)    Housing Stability     Do you have housing? : Yes     Are you worried about losing your housing?: No       --------------- PHYSICAL EXAM ---------------  Nursing notes and vitals independently reviewed by me.  VITALS:  Vitals:    03/13/25 1748   BP: (!) 142/73   Pulse: 115   Resp: 16   Temp: 98.5  F (36.9  " C)   TempSrc: Oral   SpO2: 99%   Weight: 83 kg (183 lb)   Height: 1.727 m (5' 8\")       PHYSICAL EXAM:    General:  alert, interactive, no distress  Eyes:  conjunctivae clear, conjugate gaze  HENT:  atraumatic, nose with no rhinorrhea, oropharynx clear  Neck:  no meningismus  Cardiovascular:  HR 80s during exam, regular rhythm, no murmurs, brisk cap refill  Chest:  no chest wall tenderness  Pulmonary:  no stridor, normal phonation, normal work of breathing, clear lungs bilaterally  Abdomen:  soft, nondistended, nontender  :  mild focal tenderness to right testicle on exam with no edema or overlying skin changes, unremarkable circumcised penis, no inguinal hernia, cremasteric reflexes intact bilaterally  Back:  no midline spinal tenderness  Musculoskeletal:  no pretibial edema, no calf tenderness. Gross ROM intact to joints of extremities with no obvious deformities.  Skin:  warm, dry, no rash  Neuro:  awake, alert, answers questions appropriately, follows commands, moves all limbs  Psych:  calm, normal affect      --------------- RESULTS ---------------  LAB:  Reviewed and independently interpreted by me.  Results for orders placed or performed during the hospital encounter of 03/13/25   US Testicular & Scrotum w Doppler Ltd    Impression    IMPRESSION:  1.  Normal testicles.    2.  Small epididymal cysts or spermatoceles within the epididymal heads.   UA with Microscopic reflex to Culture    Specimen: Urine, Midstream   Result Value Ref Range    Color Urine Yellow Colorless, Straw, Light Yellow, Yellow    Appearance Urine Clear Clear    Glucose Urine Negative Negative mg/dL    Bilirubin Urine Negative Negative    Ketones Urine 10 (A) Negative mg/dL    Specific Gravity Urine 1.025 1.001 - 1.030    Blood Urine Negative Negative    pH Urine 6.0 5.0 - 7.0    Protein Albumin Urine 10 (A) Negative mg/dL    Urobilinogen Urine <2.0 <2.0 mg/dL    Nitrite Urine Negative Negative    Leukocyte Esterase Urine Negative " Negative    Mucus Urine Present (A) None Seen /LPF    RBC Urine 1 <=2 /HPF    WBC Urine 1 <=5 /HPF    Squamous Epithelials Urine <1 <=1 /HPF         RADIOLOGY:  Reviewed and independently interpreted by me. Please see official radiology report.  Recent Results (from the past 24 hours)   US Testicular & Scrotum w Doppler Ltd    Narrative    EXAM: US TESTICULAR AND SCROTUM WITH DOPPLER LIMITED  LOCATION: [facility]  DATE: 3/13/2025    INDICATION: right testicle pain  COMPARISON: None.  TECHNIQUE: Ultrasound of scrotum with color flow and spectral Doppler with waveform analysis performed.    FINDINGS:    RIGHT: Right testicle measures 4.2 x 3.5 x 2.3 cm. Normal testicle with no masses. Normal arterial duplex and normal color flow. 2 mm epididymal cyst or spermatocele. No hydrocele. No varicocele.    LEFT: Left testicle measures 4.1 x 3.3 x 2.4 cm. Normal testicle with no masses. Normal arterial duplex and normal color flow. 2 tiny epididymal cysts or spermatoceles. No hydrocele. No varicocele.      Impression    IMPRESSION:  1.  Normal testicles.    2.  Small epididymal cysts or spermatoceles within the epididymal heads.                       --------------- ADDITIONAL MDM ---------------  Sepsis/STEMI/Stroke Measures:  None    MIPS:  Not Applicable    History:  - I considered systemic symptoms of the presenting illness.  - Supplemental history from:       -- patient, family  - External Record(s) reviewed:       -- Inpatient/outpatient record (clinic visit 3/13/25), prior labs (blood 4/15/22), prior imaging (CXR 4/15/22)       -- see above ED course & MDM for further details    Workup:  - Chart documentation above includes differential considered and my independent interpretation any EKGs, labs tests, and/or imaging  - emergent/severe conditions considered and evaluated for: see above differential & MDM  - In additional to work up documented, I considered the following work up:       -- blood,  CT abdomen/pelvis       -- see above ED course & MDM for further details    External Consultation:  - Discussion of management with another provider:       -- ED pharmacist re: meds       -- see above charting for additional    Complicating Factors:  - Care impacted by chronic illness:       -- see above MDM, past medical history, & problem list    Disposition Considerations:  - Discharge       -- I considered escalation of care with admission to the hospital, but ultimately discharged the patient given reassuring workup & exam, comfortable with discharge       -- I recommended the patient continue their current prescription strength medication(s) as charted above in current medications list       -- I considered prescription pain medication, comfortable without this       -- I recommended over-the-counter medication(s) as charted above & in discharge instructions         I, Kim Lim, am serving as a scribe to document services personally performed by Dr. Jason Parada based on my observation and the provider's statements to me. I, Jason Parada MD attest that Kim Lim is acting in a scribe capacity, has observed my performance of the services and has documented them in accordance with my direction.      Jason Parada MD  03/13/25  Emergency Medicine  [facility] EMERGENCY ROOM  [address] [id number] [phone]  Dept: [phone]      Jason Parada MD  03/13/25 1400

## 2025-03-17 NOTE — TELEPHONE ENCOUNTER
Left message to return call. If patient calls back, please route to [facility].     TCs, please send to RNs.    Second TCM outreach, signing encounter.     Ismael Laboy RN  [facility]

## 2025-04-14 SDOH — HEALTH STABILITY: PHYSICAL HEALTH: ON AVERAGE, HOW MANY MINUTES DO YOU ENGAGE IN EXERCISE AT THIS LEVEL?: PATIENT DECLINED

## 2025-04-14 SDOH — HEALTH STABILITY: PHYSICAL HEALTH
ON AVERAGE, HOW MANY DAYS PER WEEK DO YOU ENGAGE IN MODERATE TO STRENUOUS EXERCISE (LIKE A BRISK WALK)?: PATIENT DECLINED

## 2025-04-14 ASSESSMENT — PATIENT HEALTH QUESTIONNAIRE - PHQ9
10. IF YOU CHECKED OFF ANY PROBLEMS, HOW DIFFICULT HAVE THESE PROBLEMS MADE IT FOR YOU TO DO YOUR WORK, TAKE CARE OF THINGS AT HOME, OR GET ALONG WITH OTHER PEOPLE: NOT DIFFICULT AT ALL
SUM OF ALL RESPONSES TO PHQ QUESTIONS 1-9: 4
SUM OF ALL RESPONSES TO PHQ QUESTIONS 1-9: 4

## 2025-04-14 ASSESSMENT — SOCIAL DETERMINANTS OF HEALTH (SDOH): HOW OFTEN DO YOU GET TOGETHER WITH FRIENDS OR RELATIVES?: TWICE A WEEK

## 2025-04-15 ENCOUNTER — OFFICE VISIT (OUTPATIENT)
Dept: FAMILY MEDICINE | Facility: CLINIC | Age: 30
End: 2025-04-15
Payer: COMMERCIAL

## 2025-04-15 VITALS
BODY MASS INDEX: 27.43 KG/M2 | OXYGEN SATURATION: 97 % | HEART RATE: 78 BPM | DIASTOLIC BLOOD PRESSURE: 66 MMHG | TEMPERATURE: 98.2 F | SYSTOLIC BLOOD PRESSURE: 106 MMHG | WEIGHT: 181 LBS | RESPIRATION RATE: 20 BRPM | HEIGHT: 68 IN

## 2025-04-15 DIAGNOSIS — R74.8 ELEVATED LIVER ENZYMES: ICD-10-CM

## 2025-04-15 DIAGNOSIS — F41.1 GAD (GENERALIZED ANXIETY DISORDER): ICD-10-CM

## 2025-04-15 DIAGNOSIS — F33.3 DEPRESSION, MAJOR, RECURRENT, SEVERE WITH PSYCHOSIS (H): ICD-10-CM

## 2025-04-15 DIAGNOSIS — K21.00 GASTROESOPHAGEAL REFLUX DISEASE WITH ESOPHAGITIS, UNSPECIFIED WHETHER HEMORRHAGE: ICD-10-CM

## 2025-04-15 DIAGNOSIS — E78.5 HYPERLIPIDEMIA LDL GOAL <100: ICD-10-CM

## 2025-04-15 DIAGNOSIS — Z00.00 ANNUAL PHYSICAL EXAM: Primary | ICD-10-CM

## 2025-04-15 DIAGNOSIS — Z83.79 FAMILY HISTORY OF CELIAC DISEASE: ICD-10-CM

## 2025-04-15 LAB
ALBUMIN SERPL BCG-MCNC: 5 G/DL (ref 3.5–5.2)
ALP SERPL-CCNC: 71 U/L (ref 40–150)
ALT SERPL W P-5'-P-CCNC: 94 U/L (ref 0–70)
ANION GAP SERPL CALCULATED.3IONS-SCNC: 11 MMOL/L (ref 7–15)
AST SERPL W P-5'-P-CCNC: 44 U/L (ref 0–45)
BILIRUB SERPL-MCNC: 0.7 MG/DL
BUN SERPL-MCNC: 10.4 MG/DL (ref 6–20)
CALCIUM SERPL-MCNC: 10.2 MG/DL (ref 8.8–10.4)
CHLORIDE SERPL-SCNC: 104 MMOL/L (ref 98–107)
CHOLEST SERPL-MCNC: 224 MG/DL
CREAT SERPL-MCNC: 0.92 MG/DL (ref 0.67–1.17)
EGFRCR SERPLBLD CKD-EPI 2021: >90 ML/MIN/1.73M2
ERYTHROCYTE [DISTWIDTH] IN BLOOD BY AUTOMATED COUNT: 12.2 % (ref 10–15)
FASTING STATUS PATIENT QL REPORTED: ABNORMAL
FASTING STATUS PATIENT QL REPORTED: ABNORMAL
GLUCOSE SERPL-MCNC: 91 MG/DL (ref 70–99)
HCO3 SERPL-SCNC: 28 MMOL/L (ref 22–29)
HCT VFR BLD AUTO: 46.7 % (ref 40–53)
HDLC SERPL-MCNC: 30 MG/DL
HGB BLD-MCNC: 17 G/DL (ref 13.3–17.7)
LDLC SERPL CALC-MCNC: 138 MG/DL
MCH RBC QN AUTO: 30.1 PG (ref 26.5–33)
MCHC RBC AUTO-ENTMCNC: 36.4 G/DL (ref 31.5–36.5)
MCV RBC AUTO: 83 FL (ref 78–100)
NONHDLC SERPL-MCNC: 194 MG/DL
PLATELET # BLD AUTO: 221 10E3/UL (ref 150–450)
POTASSIUM SERPL-SCNC: 4.2 MMOL/L (ref 3.4–5.3)
PROT SERPL-MCNC: 7.8 G/DL (ref 6.4–8.3)
RBC # BLD AUTO: 5.64 10E6/UL (ref 4.4–5.9)
SODIUM SERPL-SCNC: 143 MMOL/L (ref 135–145)
TRIGL SERPL-MCNC: 278 MG/DL
WBC # BLD AUTO: 6.9 10E3/UL (ref 4–11)

## 2025-04-15 PROCEDURE — 3074F SYST BP LT 130 MM HG: CPT | Performed by: PHYSICIAN ASSISTANT

## 2025-04-15 PROCEDURE — 99395 PREV VISIT EST AGE 18-39: CPT | Performed by: PHYSICIAN ASSISTANT

## 2025-04-15 PROCEDURE — 3078F DIAST BP <80 MM HG: CPT | Performed by: PHYSICIAN ASSISTANT

## 2025-04-15 PROCEDURE — 36415 COLL VENOUS BLD VENIPUNCTURE: CPT | Performed by: PHYSICIAN ASSISTANT

## 2025-04-15 PROCEDURE — 80053 COMPREHEN METABOLIC PANEL: CPT | Performed by: PHYSICIAN ASSISTANT

## 2025-04-15 PROCEDURE — 99214 OFFICE O/P EST MOD 30 MIN: CPT | Mod: 25 | Performed by: PHYSICIAN ASSISTANT

## 2025-04-15 PROCEDURE — 85027 COMPLETE CBC AUTOMATED: CPT | Performed by: PHYSICIAN ASSISTANT

## 2025-04-15 PROCEDURE — 80061 LIPID PANEL: CPT | Performed by: PHYSICIAN ASSISTANT

## 2025-04-15 NOTE — PROGRESS NOTES
"Preventive Care Visit  [facility]  Conner Massey PA-C, Family Medicine  Apr 15, 2025      Assessment & Plan     Annual physical exam  Overall doing well.  Healthy lifestyle discussed.  Fasting labs updated  - CBC with platelets; Future  - CBC with platelets    Hyperlipidemia LDL goal <100  Last lipid panel mildly elevated.  We did discuss diet and exercise today.  He is fasting so we will update fasting lipid panel  - Lipid panel reflex to direct LDL Fasting; Future  - Lipid panel reflex to direct LDL Fasting    FLASH (generalized anxiety disorder)  Depression, major, recurrent, severe with psychosis (H)  Previously on Lexapro and Wellbutrin has successfully weaned off of both medications.  He feels that his mood is stable at this time    Elevated liver enzymes  Mildly elevated ALT 4/15/2022.  Will update complete metabolic panel today  - Comprehensive metabolic panel (BMP + Alb, Alk Phos, ALT, AST, Total. Bili, TP); Future  - Comprehensive metabolic panel (BMP + Alb, Alk Phos, ALT, AST, Total. Bili, TP)    Gastroesophageal reflux disease with esophagitis, unspecified whether hemorrhage  Continues to use omeprazole as needed.  Stable.    Family history of celiac disease  Underwent TTG 5/17/2024 which was negative.  He is not having any GI symptoms    Patient has been advised of split billing requirements and indicates understanding: Yes        BMI  Estimated body mass index is 27.52 kg/m  as calculated from the following:    Height as of this encounter: 1.727 m (5' 8\").    Weight as of this encounter: 82.1 kg (181 lb).       Counseling  Appropriate preventive services were addressed with this patient via screening, questionnaire, or discussion as appropriate for fall prevention, nutrition, physical activity, Tobacco-use cessation, social engagement, weight loss and cognition.  Checklist reviewing preventive services available has been given to the patient.  Reviewed patient's diet, " addressing concerns and/or questions.   He is at risk for psychosocial distress and has been provided with information to reduce risk.         Seda Pike is a 29 year old, presenting for the following:  Physical (Fasting Physical)        4/15/2025     8:33 AM   Additional Questions   Roomed by Arianna Larsen   Accompanied by none          This is a pleasant 29-year-old male presents to the clinic today for annual physical.  Overall he is feeling well.  Past medical history significant for anxiety, depression, and family history of celiac disease.  No questions or concerns regarding his chronic health.    Recently in the ER for testicular pain.  Urinalysis was essentially normal.  Testicular ultrasound did show tiny left epididymal cyst.  Pain has since resolved.           Advance Care Planning  Patient does not have a Health Care Directive: Discussed advance care planning with patient; however, patient declined at this time.      4/14/2025   General Health   How would you rate your overall physical health? (!) FAIR   Feel stress (tense, anxious, or unable to sleep) To some extent   (!) STRESS CONCERN      4/14/2025   Nutrition   Three or more servings of calcium each day? Yes   Diet: I don't know   How many servings of fruit and vegetables per day? (!) I DON'T KNOW   How many sweetened beverages each day? 0-1         4/14/2025   Exercise   Days per week of moderate/strenous exercise Patient declined   Average minutes spent exercising at this level Patient declined         4/14/2025   Social Factors   Frequency of gathering with friends or relatives Twice a week   Worry food won't last until get money to buy more No   Food not last or not have enough money for food? No   Do you have housing? (Housing is defined as stable permanent housing and does not include staying ouside in a car, in a tent, in an abandoned building, in an overnight shelter, or couch-surfing.) Yes   Are you worried about losing your housing?  No   Lack of transportation? No   Unable to get utilities (heat,electricity)? No         4/14/2025   Dental   Dentist two times every year? Yes           5/8/2024   TB Screening   Were you born outside of the [place]? No         Today's PHQ-9 Score:       4/14/2025     7:08 AM   PHQ-9 SCORE   PHQ-9 Total Score MyChart 4 (Minimal depression)   PHQ-9 Total Score 4        Patient-reported         4/14/2025   Substance Use   Alcohol more than 3/day or more than 7/wk No   Do you use any other substances recreationally? No     Social History     Tobacco Use    Smoking status: Never     Passive exposure: Never    Smokeless tobacco: Never   Vaping Use    Vaping status: Never Used   Substance Use Topics    Alcohol use: Not Currently    Drug use: Never             4/14/2025   One time HIV Screening   Previous HIV test? No         4/14/2025   STI Screening   New sexual partner(s) since last STI/HIV test? No         4/14/2025   Contraception/Family Planning   Questions about contraception or family planning No        Reviewed and updated as needed this visit by Provider                    Past Medical History:   Diagnosis Date    Concussion with loss of consciousness of 30 minutes or less     Created by Conversion     Depression     Suicidal ideation     Suicide attempt (H)     suicidal ideation, no attempt     No past surgical history on file.  BP Readings from Last 3 Encounters:   04/15/25 106/66   03/13/25 (!) 142/73   03/13/25 119/71    Wt Readings from Last 3 Encounters:   04/15/25 82.1 kg (181 lb)   03/13/25 83 kg (183 lb)   03/13/25 83 kg (183 lb)                  Patient Active Problem List   Diagnosis    FLASH (generalized anxiety disorder)    Depression, major, recurrent, severe with psychosis (H)    Mood disorder    Annual physical exam    Atypical chest pain    Hyperlipidemia LDL goal <100    Elevated liver enzymes    Gastroesophageal reflux disease with esophagitis, unspecified whether hemorrhage    Family history of  "celiac disease     No past surgical history on file.    Social History     Tobacco Use    Smoking status: Never     Passive exposure: Never    Smokeless tobacco: Never   Substance Use Topics    Alcohol use: Not Currently     Family History   Problem Relation Age of Onset    No Known Problems Mother     No Known Problems Father     Anxiety Disorder Sister          Current Outpatient Medications   Medication Sig Dispense Refill    omeprazole (PRILOSEC) 20 MG DR capsule Take 1 capsule (20 mg) by mouth daily. 90 capsule 3         Review of Systems  Constitutional, HEENT, cardiovascular, pulmonary, GI, , musculoskeletal, neuro, skin, endocrine and psych systems are negative, except as otherwise noted.     Objective    Exam  /66 (BP Location: Right arm, Patient Position: Sitting, Cuff Size: Adult Regular)   Pulse 78   Temp 98.2  F (36.8  C) (Oral)   Resp 20   Ht 1.727 m (5' 8\")   Wt 82.1 kg (181 lb)   SpO2 97%   BMI 27.52 kg/m     Estimated body mass index is 27.52 kg/m  as calculated from the following:    Height as of this encounter: 1.727 m (5' 8\").    Weight as of this encounter: 82.1 kg (181 lb).    Physical Exam  Vitals and nursing note reviewed.   Constitutional:       General: He is not in acute distress.     Appearance: Normal appearance. He is well-developed and well-groomed. He is not ill-appearing or toxic-appearing.   HENT:      Head: Normocephalic and atraumatic.      Right Ear: Tympanic membrane, ear canal and external ear normal.      Left Ear: Tympanic membrane, ear canal and external ear normal.      Mouth/Throat:      Lips: Pink.      Mouth: Mucous membranes are moist.      Palate: No mass.      Pharynx: Oropharynx is clear. Uvula midline.      Tonsils: No tonsillar exudate or tonsillar abscesses.   Eyes:      General: Lids are normal.         Right eye: No discharge.         Left eye: No discharge.   Neck:      Trachea: Trachea normal.   Cardiovascular:      Rate and Rhythm: Normal rate " and regular rhythm.      Heart sounds: S1 normal and S2 normal. No murmur heard.  Pulmonary:      Effort: Pulmonary effort is normal.      Breath sounds: Normal breath sounds and air entry.   Abdominal:      General: Bowel sounds are normal. There is no distension.      Palpations: Abdomen is soft.      Tenderness: There is no abdominal tenderness. There is no guarding or rebound.   Musculoskeletal:      Cervical back: Full passive range of motion without pain and neck supple.      Right lower leg: No edema.      Left lower leg: No edema.   Lymphadenopathy:      Cervical: No cervical adenopathy.   Skin:     General: Skin is warm and dry.      Findings: No lesion or rash.   Neurological:      General: No focal deficit present.      Mental Status: He is alert.      Cranial Nerves: No cranial nerve deficit.      Gait: Gait is intact.      Deep Tendon Reflexes:      Reflex Scores:       Patellar reflexes are 2+ on the right side and 2+ on the left side.  Psychiatric:         Attention and Perception: Attention normal.         Mood and Affect: Mood normal.         Speech: Speech normal.           Signed Electronically by: Conner Massey PA-C    Answers submitted by the patient for this visit:  Patient Health Questionnaire (Submitted on 4/14/2025)  If you checked off any problems, how difficult have these problems made it for you to do your work, take care of things at home, or get along with other people?: Not difficult at all  PHQ9 TOTAL SCORE: 4

## 2025-08-15 ENCOUNTER — E-VISIT (OUTPATIENT)
Dept: FAMILY MEDICINE | Facility: CLINIC | Age: 30
End: 2025-08-15
Payer: COMMERCIAL

## 2025-08-15 DIAGNOSIS — F41.1 GAD (GENERALIZED ANXIETY DISORDER): Primary | ICD-10-CM

## 2025-08-15 DIAGNOSIS — F33.3 DEPRESSION, MAJOR, RECURRENT, SEVERE WITH PSYCHOSIS (H): ICD-10-CM

## 2025-08-15 PROCEDURE — 99207 PR NON-BILLABLE SERV PER CHARTING: CPT | Performed by: PHYSICIAN ASSISTANT

## 2025-08-18 ENCOUNTER — PATIENT OUTREACH (OUTPATIENT)
Dept: CARE COORDINATION | Facility: CLINIC | Age: 30
End: 2025-08-18
Payer: COMMERCIAL

## 2025-08-20 ENCOUNTER — PATIENT OUTREACH (OUTPATIENT)
Dept: CARE COORDINATION | Facility: CLINIC | Age: 30
End: 2025-08-20
Payer: COMMERCIAL